# Patient Record
Sex: FEMALE | URBAN - METROPOLITAN AREA
[De-identification: names, ages, dates, MRNs, and addresses within clinical notes are randomized per-mention and may not be internally consistent; named-entity substitution may affect disease eponyms.]

---

## 2017-06-13 ENCOUNTER — OFFICE VISIT (OUTPATIENT)
Dept: FAMILY MEDICINE | Facility: CLINIC | Age: 42
End: 2017-06-13
Payer: COMMERCIAL

## 2017-06-13 VITALS
DIASTOLIC BLOOD PRESSURE: 73 MMHG | HEIGHT: 60 IN | HEART RATE: 89 BPM | TEMPERATURE: 99.2 F | BODY MASS INDEX: 32.71 KG/M2 | SYSTOLIC BLOOD PRESSURE: 126 MMHG | OXYGEN SATURATION: 98 % | WEIGHT: 166.6 LBS

## 2017-06-13 DIAGNOSIS — J45.30 MILD PERSISTENT ASTHMA WITHOUT COMPLICATION: Primary | ICD-10-CM

## 2017-06-13 PROCEDURE — 99214 OFFICE O/P EST MOD 30 MIN: CPT | Performed by: FAMILY MEDICINE

## 2017-06-13 RX ORDER — ALBUTEROL SULFATE 90 UG/1
2 AEROSOL, METERED RESPIRATORY (INHALATION) EVERY 4 HOURS PRN
Qty: 1 INHALER | Refills: 3 | Status: SHIPPED | OUTPATIENT
Start: 2017-06-13

## 2017-06-13 RX ORDER — MONTELUKAST SODIUM 10 MG/1
10 TABLET ORAL AT BEDTIME
Qty: 90 TABLET | Refills: 3 | Status: SHIPPED | OUTPATIENT
Start: 2017-06-13

## 2017-06-13 RX ORDER — FLUTICASONE PROPIONATE 50 MCG
1 SPRAY, SUSPENSION (ML) NASAL DAILY
COMMUNITY

## 2017-06-13 RX ORDER — PREDNISONE 20 MG/1
40 TABLET ORAL DAILY
Qty: 10 TABLET | Refills: 0 | Status: SHIPPED | OUTPATIENT
Start: 2017-06-13 | End: 2017-06-18

## 2017-06-13 RX ORDER — FLUTICASONE PROPIONATE AND SALMETEROL XINAFOATE 115; 21 UG/1; UG/1
2 AEROSOL, METERED RESPIRATORY (INHALATION) 2 TIMES DAILY
Qty: 12 G | Refills: 11 | Status: SHIPPED | OUTPATIENT
Start: 2017-06-13

## 2017-06-13 NOTE — PROGRESS NOTES
"  SUBJECTIVE:                                                    Aleshia Land is a 42 year old female who presents to clinic today for the following health issues:      ALLERGIES/ASTHMA      Onset: Worsened last week     Description:   Nasal congestion: YES  Sneezing: YES  Red, itchy eyes: YES    Progression of Symptoms:  same    Accompanying Signs & Symptoms:  Cough: YES  Wheezing: YES  Rash: YES- face and arms   Sinus/facial pain: no   History:   Is it seasonal: in the winter and in the summer   History of Asthma: YES- diagnosed in [place], moved the [place] two years ago   Has allergy testing been done: YES    Precipitating factors:   None    Alleviating factors:  None       Therapies Tried and outcome: Allegra, Flonase, Benadryl, Claritin, Zyrtec- no improvement       Problem list and histories reviewed & adjusted, as indicated.  Additional history: as documented    Patient Active Problem List   Diagnosis     Mild persistent asthma without complication     History reviewed. No pertinent surgical history.    Social History   Substance Use Topics     Smoking status: Never Smoker     Smokeless tobacco: Not on file     Alcohol use No     Family History   Problem Relation Age of Onset     DIABETES Mother            Reviewed and updated as needed this visit by clinical staff       Reviewed and updated as needed this visit by Provider         ROS:  Constitutional, HEENT, cardiovascular, pulmonary, GI, , musculoskeletal, neuro, skin, endocrine and psych systems are negative, except as otherwise noted.    OBJECTIVE:                                                    /73 (BP Location: Left arm, Patient Position: Sitting, Cuff Size: Adult Regular)  Pulse 89  Temp 99.2  F (37.3  C) (Oral)  Ht 5' 0.04\" (1.525 m)  Wt 166 lb 9.6 oz (75.6 kg)  SpO2 98%  BMI 32.49 kg/m2  Body mass index is 32.49 kg/(m^2).  GENERAL: healthy, alert and no distress  NECK: no adenopathy, no asymmetry, masses, or scars and thyroid normal to " palpation  RESP: some wheezing  CV: regular rate and rhythm, normal S1 S2, no S3 or S4, no murmur, click or rub, no peripheral edema and peripheral pulses strong  ABDOMEN: soft, nontender, no hepatosplenomegaly, no masses and bowel sounds normal  MS: no gross musculoskeletal defects noted, no edema    Diagnostic Test Results:  none      ASSESSMENT/PLAN:                                                      1. Mild persistent asthma without complication  With exacerbation. Treat with prednisone burst for 5 days. Start Advair and Singulair for maintenance. May use albuterol as needed. RTC in 1-2 months for recheck.  - albuterol (PROAIR HFA/PROVENTIL HFA/VENTOLIN HFA) 108 (90 BASE) MCG/ACT Inhaler; Inhale 2 puffs into the lungs every 4 hours as needed  Dispense: 1 Inhaler; Refill: 3  - fluticasone-salmeterol (ADVAIR-HFA) 115-21 MCG/ACT Inhaler; Inhale 2 puffs into the lungs 2 times daily  Dispense: 12 g; Refill: 11  - predniSONE (DELTASONE) 20 MG tablet; Take 2 tablets (40 mg) by mouth daily for 5 days  Dispense: 10 tablet; Refill: 0  - montelukast (SINGULAIR) 10 MG tablet; Take 1 tablet (10 mg) by mouth At Bedtime  Dispense: 90 tablet; Refill: 3    See Patient Instructions    Kishore Mascorro MD, MD  [facility]

## 2017-06-13 NOTE — PATIENT INSTRUCTIONS
How to contact your care team providers:    [facility]  [phone]    Pharmacy [phone]      GIFTY Jar[address]illo [address] Dr., Dr., PA-C Christopher Jones, PA-C    Team Nurses: Ayde Chester and Vivian.    Clinic hours  M-Th 7am-7pm   Fri 7am-5pm.   Urgent care M-F 11am-9pm,   Sat/sun 9am-5pm.  Pharmacy M-F 8:00am-8pm Sat/sun 9am-5pm.

## 2017-06-13 NOTE — MR AVS SNAPSHOT
After Visit Summary   6/13/2017    Aleshia Land    MRN: [id number]           Patient Information     Date Of Birth          1975        Visit Information        Provider Department      6/13/2017 3:00 PM Kishore Mascorro MD [facility]        Today's Diagnoses     Mild persistent asthma without complication    -  1      Care Instructions    How to contact your care team providers:    [facility]  [phone]    Pharmacy [phone]      GIFTY Jaramillo Dr., Dr., Dr., PA-C Christopher Jones, PA-C    Team Nurses: Ayde Chester, and Vivian.    Clinic hours  M-Th 7am-7pm   Fri 7am-5pm.   Urgent care M-F 11am-9pm,   Sat/sun 9am-5pm.  Pharmacy M-F 8:00am-8pm Sat/sun 9am-5pm.                   Follow-ups after your visit        Your next 10 appointments already scheduled     Jun 13, 2017  3:00 PM CDT   Office Visit with Kishore Mascorro MD   [facility] ([facility])    [address] [place] [id number] [phone]           Bring a current list of meds and any records pertaining to this visit.  For Physicals, please bring immunization records and any forms needing to be filled out.  Please arrive 10 minutes early to complete paperwork.              Who to contact     If you have questions or need follow up information about today's clinic visit or your schedule please contact [facility] directly at [phone].  Normal or non-critical lab and imaging results will be communicated to you by MyChart, letter or phone within 4 business days after the clinic has received the results. If you do not hear from us within 7 days, please contact the clinic through MyChart or phone. If you have a critical or abnormal lab result, we will notify you by phone as soon as  "possible.  Submit refill requests through Particle or call your pharmacy and they will forward the refill request to us. Please allow 3 business days for your refill to be completed.          Additional Information About Your Visit        Particle Information     Particle lets you send messages to your doctor, view your test results, renew your prescriptions, schedule appointments and more. To sign up, go to [email or web address] . Click on \"Log in\" on the left side of the screen, which will take you to the Welcome page. Then click on \"Sign up Now\" on the right side of the page.     You will be asked to enter the access code listed below, as well as some personal information. Please follow the directions to create your username and password.     Your access code is: [email or web address]  Expires: 2017  2:59 PM     Your access code will  in 90 days. If you need help or a new code, please call your [facility] clinic or [phone].        Care EveryWhere ID     This is your Care EveryWhere ID. This could be used by other organizations to access your [facility] medical records  [id number]        Your Vitals Were     Pulse Temperature Height Pulse Oximetry BMI (Body Mass Index)       89 99.2  F (37.3  C) (Oral) 5' 0.04\" (1.525 m) 98% 32.49 kg/m2        Blood Pressure from Last 3 Encounters:   17 126/73    Weight from Last 3 Encounters:   17 166 lb 9.6 oz (75.6 kg)              Today, you had the following     No orders found for display         Today's Medication Changes          These changes are accurate as of: 17  2:59 PM.  If you have any questions, ask your nurse or doctor.               Start taking these medicines.        Dose/Directions    albuterol 108 (90 BASE) MCG/ACT Inhaler   Commonly known as:  PROAIR HFA/PROVENTIL HFA/VENTOLIN HFA   Used for:  Mild persistent asthma without complication   Started by:  Kishore Mascorro MD        Dose:  2 puff   Inhale 2 puffs into the lungs every 4 hours as " needed   Quantity:  1 Inhaler   Refills:  3       fluticasone-salmeterol 115-21 MCG/ACT Inhaler   Commonly known as:  ADVAIR-HFA   Used for:  Mild persistent asthma without complication   Started by:  Kishore Mascorro MD        Dose:  2 puff   Inhale 2 puffs into the lungs 2 times daily   Quantity:  12 g   Refills:  11       montelukast 10 MG tablet   Commonly known as:  SINGULAIR   Used for:  Mild persistent asthma without complication   Started by:  Kishore Mascorro MD        Dose:  10 mg   Take 1 tablet (10 mg) by mouth At Bedtime   Quantity:  90 tablet   Refills:  3       predniSONE 20 MG tablet   Commonly known as:  DELTASONE   Used for:  Mild persistent asthma without complication   Started by:  Kishore Mascorro MD        Dose:  40 mg   Take 2 tablets (40 mg) by mouth daily for 5 days   Quantity:  10 tablet   Refills:  0         Stop taking these medicines if you haven't already. Please contact your care team if you have questions.     ALLEGRA PO   Stopped by:  Kishore Mascorro MD           BENADRYL PO   Stopped by:  Kishore Mascorro MD                Where to get your medicines      These medications were sent to [facility] [place], [place] - [address]  [address], [facility] [address]     Phone:  [phone]     albuterol 108 (90 BASE) MCG/ACT Inhaler    fluticasone-salmeterol 115-21 MCG/ACT Inhaler    montelukast 10 MG tablet    predniSONE 20 MG tablet                Primary Care Provider    None Specified       No primary provider on file.        Thank you!     Thank you for choosing [facility]  for your care. Our goal is always to provide you with excellent care. Hearing back from our patients is one way we can continue to improve our services. Please take a few minutes to complete the written survey that you may receive in the mail after your visit with us. Thank you!             Your Updated Medication List - Protect others around you: Learn how to safely use, store and  throw away your medicines at www.disposemymeds.org.          This list is accurate as of: 6/13/17  2:59 PM.  Always use your most recent med list.                   Brand Name Dispense Instructions for use    albuterol 108 (90 BASE) MCG/ACT Inhaler    PROAIR HFA/PROVENTIL HFA/VENTOLIN HFA    1 Inhaler    Inhale 2 puffs into the lungs every 4 hours as needed       fluticasone 50 MCG/ACT spray    FLONASE     Spray 1 spray into both nostrils daily       fluticasone-salmeterol 115-21 MCG/ACT Inhaler    ADVAIR-HFA    12 g    Inhale 2 puffs into the lungs 2 times daily       montelukast 10 MG tablet    SINGULAIR    90 tablet    Take 1 tablet (10 mg) by mouth At Bedtime       predniSONE 20 MG tablet    DELTASONE    10 tablet    Take 2 tablets (40 mg) by mouth daily for 5 days

## 2017-06-13 NOTE — NURSING NOTE
"Chief Complaint   Patient presents with     Asthma Consult       Initial /73 (BP Location: Left arm, Patient Position: Sitting, Cuff Size: Adult Regular)  Pulse 89  Temp 99.2  F (37.3  C) (Oral)  Ht 5' 0.04\" (1.525 m)  Wt 166 lb 9.6 oz (75.6 kg)  SpO2 98%  BMI 32.49 kg/m2 Estimated body mass index is 32.49 kg/(m^2) as calculated from the following:    Height as of this encounter: 5' 0.04\" (1.525 m).    Weight as of this encounter: 166 lb 9.6 oz (75.6 kg).  Medication Reconciliation: complete   Jannie Miranda MA      "

## 2017-06-14 ASSESSMENT — ASTHMA QUESTIONNAIRES: ACT_TOTALSCORE: 10

## 2017-08-21 ENCOUNTER — TELEPHONE (OUTPATIENT)
Dept: FAMILY MEDICINE | Facility: CLINIC | Age: 42
End: 2017-08-21

## 2017-08-21 NOTE — LETTER
August 21, 2017      Aleshia Land  [address] [place] [address]        Dear Aleshia Land,      At [facility] we care about your health and are committed to providing quality patient care. It has come to our attention that you are due for an Asthma Control Test.     This screening tool helps us to assess how well your asthma is controlled. Good asthma control leads to less asthma symptoms and greater health. If your asthma is not in good control (score less than 20) it is recommended you be seen by your provider for medication and lifestyle adjustments    We have enclosed an  Asthma Control Test. Please complete the enclosed asthma control test even if you are feeling well. You can mail it back to our clinic or drop if off at our .     Thank you for your time and we hope this letter finds you well!      Sincerely,    Your Team at [facility]

## 2017-09-29 NOTE — TELEPHONE ENCOUNTER
This writer attempted to contact Aleshia on 09/29/17      Reason for call ACT and left detailed message.      If patient calls back:   Patient contacted by 1st floor [place] Care Team (MA/TC). Inform patient that someone from the team will contact them, document that pt called and route to care team. .        Alejandro Yanes, CMA

## 2017-10-27 ASSESSMENT — ASTHMA QUESTIONNAIRES: ACT_TOTALSCORE: 24

## 2018-03-13 ENCOUNTER — TELEPHONE (OUTPATIENT)
Dept: FAMILY MEDICINE | Facility: CLINIC | Age: 43
End: 2018-03-13

## 2018-03-13 NOTE — TELEPHONE ENCOUNTER
Panel Management Review      Patient is due/failing the following:   PAP and PHYSICAL  Action needed:   Patient needs office visit for Physical.    Type of outreach:    Sent letter.    Questions for provider review:    None    JUANITA Sam

## 2018-03-13 NOTE — LETTER
March 13, 2018      Aleshia Land  [address] [place] [address]                Dear Aleshia,    In order to ensure we are providing the best quality care, we have reviewed your chart and see that you are due for:    -Physical with pap smear: Pap smear is a screening test used to detect cervical cancer. It is recommended every three years for women 21 and older. The test should be done at least once every three years but women who are at greater risk for cervical cancer may need to have the test more often.    Please call the clinic at your earliest convenience to schedule an appointment. If you have had any of the screenings listed above at another facility, please call us so that we may update your chart.      Thank you for trusting us with your health care.    Sincerely,    [facility]/ [phone]  [phone]

## 2018-06-26 ENCOUNTER — TELEPHONE (OUTPATIENT)
Dept: FAMILY MEDICINE | Facility: CLINIC | Age: 43
End: 2018-06-26

## 2018-06-26 NOTE — LETTER
June 26, 2018    Aleshia Land  [address] [place] [address]      Dear Aleshia Land,     In order to ensure we are providing the best quality care, we have reviewed your chart and see that you are due for:    Physical with pap smear: Pap smear is a screening test used to detect cervical cancer. It is recommended every three years for women 21 and older. The test should be done at least once every three years but women who are at greater risk for cervical cancer may need to have the test more often.    Please call the clinic at your earliest convenience to schedule an appointment. If you have had any of the screenings listed above at another facility, please call us so that we may update your chart.      Thank you for trusting us with your health care.    Sincerely,    [facility]/mb  [phone]  [phone]

## 2018-06-26 NOTE — TELEPHONE ENCOUNTER
Panel Management Review      BP Readings from Last 1 Encounters:   06/13/17 126/73      Last Office Visit with this department: 3/13/2018    Fail List measure: PAP      Patient is due/failing the following:   PAP    Action needed:   Patient needs office visit for PHYSICAL.    Type of outreach:    Sent letter.    Questions for provider review:    None                                                                                                                                    Shannan Norris MA      Chart routed to  .

## 2024-12-17 ENCOUNTER — OFFICE VISIT (OUTPATIENT)
Dept: FAMILY MEDICINE | Facility: CLINIC | Age: 49
End: 2024-12-17

## 2024-12-17 VITALS
OXYGEN SATURATION: 98 % | DIASTOLIC BLOOD PRESSURE: 83 MMHG | RESPIRATION RATE: 16 BRPM | TEMPERATURE: 97.8 F | SYSTOLIC BLOOD PRESSURE: 146 MMHG | HEART RATE: 101 BPM | BODY MASS INDEX: 39.15 KG/M2 | HEIGHT: 59 IN | WEIGHT: 194.2 LBS

## 2024-12-17 DIAGNOSIS — J06.9 VIRAL URI WITH COUGH: Primary | ICD-10-CM

## 2024-12-17 DIAGNOSIS — Z59.71 DOES NOT HAVE HEALTH INSURANCE: ICD-10-CM

## 2024-12-17 DIAGNOSIS — Z87.09 HISTORY OF ASTHMA: ICD-10-CM

## 2024-12-17 DIAGNOSIS — R03.0 ELEVATED BLOOD PRESSURE READING WITHOUT DIAGNOSIS OF HYPERTENSION: ICD-10-CM

## 2024-12-17 DIAGNOSIS — R07.0 THROAT PAIN: ICD-10-CM

## 2024-12-17 LAB
DEPRECATED S PYO AG THROAT QL EIA: NEGATIVE
S PYO DNA THROAT QL NAA+PROBE: NOT DETECTED

## 2024-12-17 PROCEDURE — 87798 DETECT AGENT NOS DNA AMP: CPT | Performed by: PREVENTIVE MEDICINE

## 2024-12-17 PROCEDURE — 87651 STREP A DNA AMP PROBE: CPT | Performed by: PREVENTIVE MEDICINE

## 2024-12-17 PROCEDURE — 99203 OFFICE O/P NEW LOW 30 MIN: CPT | Performed by: PREVENTIVE MEDICINE

## 2024-12-17 RX ORDER — PREDNISONE 20 MG/1
20 TABLET ORAL 2 TIMES DAILY
Qty: 10 TABLET | Refills: 0 | Status: SHIPPED | OUTPATIENT
Start: 2024-12-17 | End: 2024-12-22

## 2024-12-17 RX ORDER — BENZONATATE 200 MG/1
200 CAPSULE ORAL 3 TIMES DAILY PRN
Qty: 30 CAPSULE | Refills: 0 | Status: SHIPPED | OUTPATIENT
Start: 2024-12-17

## 2024-12-17 NOTE — PROGRESS NOTES
Assessment & Plan     Viral URI with cough  -await labs  -hydration and monitor temperature  - B. pertussis/parapertussis PCR-NP  - benzonatate (TESSALON) 200 MG capsule  Dispense: 30 capsule; Refill: 0  - predniSONE (DELTASONE) 20 MG tablet  Dispense: 10 tablet; Refill: 0    Throat pain  -saline gargles  -defer Covid and Mono test for now. Even if positive for Covid will not  plan, clinically less likely to be [place] and patient is self pay  - Streptococcus A Rapid Screen w/Reflex to PCR - Clinic Collect  - predniSONE (DELTASONE) 20 MG tablet  Dispense: 10 tablet; Refill: 0    Does not have health insurance  - Primary Care - Care Coordination Referral    History of asthma  -no recent exacerbation     Elevated blood pressure reading without diagnosis of hypertension  -DASH diet  -monitor at store  -low salt  -avoid over the counter decongestants.         Your symptoms and exam today indicate that you have a viral upper respiratory illness.  This includes viral rhinosinusitis and viral bronchitis.  Antibiotics do not help viral illnesses. The typical course of a viral illness is that you feel miserable for the first few days - with sore throat, runny nose/nasal congestion, cough, and sometimes fever and body aches.  You should start to feel better after about 5-7 days and much better by 10-14 days.  If you develop sudden worsening of symptoms or fever after the first 5-7 days, or if you have persistence of your symptoms beyond 14 days, let us know as you may have developed a secondary bacterial infection.  Get plenty of rest, especially while you have a fever.  Drink lots of fluids such as water and clear soups. Fluids help loosen mucus. Fluids are also important because they help prevent dehydration. Gargle with warm salt water a few times a day to relieve a sore throat. Throat sprays or lozenges may also help relieve the pain.Avoid alcohol. Use saline (salt water) nose drops to help loosen mucus  "and moisten the tender skin in your nose.      BMI  Estimated body mass index is 39.22 kg/m  as calculated from the following:    Height as of this encounter: 1.499 m (4' 11\").    Weight as of this encounter: 88.1 kg (194 lb 3.2 oz).       Lidya Monk is a 49 year old, presenting for the following health issues:  Cold Symptoms        12/17/2024     3:42 PM   Additional Questions   Roomed by Jacob   Accompanied by self         12/17/2024     3:42 PM   Patient Reported Additional Medications   Patient reports taking the following new medications no     History of Present Illness       Reason for visit:  [facility]  Symptom onset:  3-4 weeks ago  Symptom intensity:  Severe  Symptom progression:  Worsening  Had these symptoms before:  Yes  Has tried/received treatment for these symptoms:  No      Sore throat for 4 weeks, started with sore throat and then cold symptoms that improved but throat pain has persisted  Other family members are better  More in the morning  No wheezing  No shortness of breath  Stayed home yesterday  No fever  No rash  No GI problems  Fluids+   Cough+ stuck in throat kind of feeling     Cough is strong, no post tussive emesis  Family had been sick too  Cleans houses+          Objective    BP (!) 146/83 (BP Location: Left arm, Patient Position: Sitting, Cuff Size: Adult Large)   Pulse 101   Temp 97.8  F (36.6  C) (Temporal)   Resp 16   Ht 1.499 m (4' 11\")   Wt 88.1 kg (194 lb 3.2 oz)   LMP 10/01/2024 (Approximate)   SpO2 98%   BMI 39.22 kg/m    Body mass index is 39.22 kg/m .  Physical Exam   GENERAL APPEARANCE: healthy, alert and no distress  EYES: Eyes grossly normal to inspection and conjunctivae and sclerae normal  HENT: nose and mouth without ulcers or lesions, mild pharyngeal erythema, no exudates or pus points, no uvular deviation.   NECK: no adenopathy and trachea midline and normal to palpation  RESP: lungs clear to auscultation - no rales, rhonchi or wheezes  CV: regular " rates and rhythm, normal S1 S2  ABDOMEN: soft, non-tender and no rebound or guarding   MS: extremities normal- no gross deformities noted   SKIN: no suspicious lesions or rashes  NEURO: Normal strength and tone, mentation intact and speech normal  PSYCH: mentation appears normal      No results found for this or any previous visit (from the past 24 hours).        Signed Electronically by: Marilee Julien MD

## 2024-12-17 NOTE — LETTER
December 18, 2024      Aleshia Land  [address] [place] [address]        Dear Aleshia Land,     Pertussis test is negative.     Regards,     Marilee Julien MD MPH     Resulted Orders   Streptococcus A Rapid Screen w/Reflex to PCR - Clinic Collect   Result Value Ref Range    Group A Strep antigen Negative Negative   B. pertussis/parapertussis PCR-NP   Result Value Ref Range    Bordetella pertussis DNA Not Detected Not Detected      Comment:      A negative result does not rule out the presence of PCR inhibitors or Bordetella pertussis DNA in concentrations below the limit of detection of the assay.    Bordetella parapertussis DNA Not Detected Not Detected      Comment:      A negative result does not rule out the presence of PCR inhibitors or Bordetella parapertussis DNA in concentrations below the limit of detection for the assay.    Narrative    The [employer] Molecular Simplexa (TM) Bordetella Direct assay is a FDA-approved, real-time PCR test for the qualitative detection and differentiation of Bordetella pertussis and Bordetella parapertussis in nasopharyngeal swabs. Testing is performed by the [facility]. This test is used for clinical purposes. It should not be regarded as investigational or for research. This laboratory is certified under the Clinical Laboratory Improvement Amendments of 1988 (CLIA-88) as qualified to perform high complexity clinical laboratory testing.   Group A Streptococcus PCR Throat Swab   Result Value Ref Range    Group A strep by PCR Not Detected Not Detected    Narrative    The Xpert Xpress Strep A test, performed on the [employer]  Instrument Systems, is a rapid, qualitative in vitro diagnostic test for the detection of Streptococcus pyogenes (Group A ß-hemolytic Streptococcus, Strep A) in throat swab specimens from patients with signs and symptoms of pharyngitis. The Xpert Xpress Strep A test can be used as an aid in  the diagnosis of Group A Streptococcal pharyngitis. The assay is not intended to monitor treatment for Group A Streptococcus infections. The Xpert Xpress Strep A test utilizes an automated real-time polymerase chain reaction (PCR) to detect Streptococcus pyogenes DNA.

## 2024-12-17 NOTE — LETTER
December 18, 2024      Aleshia Land  [address] [place] [address]        Dear ,    Strep test was negative.   Please let me know if you have any questions and thank you for choosing [facility].     Regards,     Marilee Julien MD MPH     Resulted Orders   Streptococcus A Rapid Screen w/Reflex to PCR - Clinic Collect   Result Value Ref Range    Group A Strep antigen Negative Negative   Group A Streptococcus PCR Throat Swab   Result Value Ref Range    Group A strep by PCR Not Detected Not Detected    Narrative    The Xpert Xpress Strep A test, performed on the [employer], is a rapid, qualitative in vitro diagnostic test for the detection of Streptococcus pyogenes (Group A ß-hemolytic Streptococcus, Strep A) in throat swab specimens from patients with signs and symptoms of pharyngitis. The Xpert Xpress Strep A test can be used as an aid in the diagnosis of Group A Streptococcal pharyngitis. The assay is not intended to monitor treatment for Group A Streptococcus infections. The Xpert Xpress Strep A test utilizes an automated real-time polymerase chain reaction (PCR) to detect Streptococcus pyogenes DNA.

## 2024-12-17 NOTE — LETTER
December 18, 2024      Aleshia Land  [address] [place] [address]        Dear ,    We are writing to inform you of your test results.    Strep test is negative.    Resulted Orders   Group A Streptococcus PCR Throat Swab   Result Value Ref Range    Group A strep by PCR Not Detected Not Detected    Narrative    The Xpert Xpress Strep A test, performed on the [employer]  Instrument Systems, is a rapid, qualitative in vitro diagnostic test for the detection of Streptococcus pyogenes (Group A ß-hemolytic Streptococcus, Strep A) in throat swab specimens from patients with signs and symptoms of pharyngitis. The Xpert Xpress Strep A test can be used as an aid in the diagnosis of Group A Streptococcal pharyngitis. The assay is not intended to monitor treatment for Group A Streptococcus infections. The Xpert Xpress Strep A test utilizes an automated real-time polymerase chain reaction (PCR) to detect Streptococcus pyogenes DNA.       If you have any questions or concerns, please call the clinic at the number listed above.       Sincerely,      Marilee Julien MD

## 2024-12-18 ENCOUNTER — TELEPHONE (OUTPATIENT)
Dept: CARE COORDINATION | Facility: CLINIC | Age: 49
End: 2024-12-18

## 2024-12-18 ENCOUNTER — PATIENT OUTREACH (OUTPATIENT)
Dept: CARE COORDINATION | Facility: CLINIC | Age: 49
End: 2024-12-18

## 2024-12-18 DIAGNOSIS — L30.9 DERMATITIS: Primary | ICD-10-CM

## 2024-12-18 LAB
B PARAPERT DNA SPEC QL NAA+PROBE: NOT DETECTED
B PERT DNA SPEC QL NAA+PROBE: NOT DETECTED

## 2024-12-18 NOTE — PROGRESS NOTES
Community Health Worker Initial Outreach    CHW Initial Information Gathering:  Referral Source: PCP  Preferred Hospital: [facility], [place]  [phone]  Preferred Urgent Care: [facility] [place], [phone]  Current living arrangement:: I live in a private home  Type of residence:: Private home - stairs  Community Resources: None  Supplies Currently Used at Home: None  Equipment Currently Used at Home: none  Informal Support system:: Family  No PCP office visit in Past Year: No  Transportation means:: Regular car  CHW Additional Questions  If ED/Hospital discharge, follow-up appointment scheduled as recommended?: N/A  Medication changes made following ED/Hospital discharge?: N/A  MyChart active?: No  Patient agreeable to assistance with activating MyChart?: No    Patient accepts CC: Yes. Patient scheduled for assessment with CC SW on 12/20 at 2 pm. Patient noted desire to discuss insurance options and other financial concerns.     ZEE Fragoso  [facility] [place], [place] and [facility]  [phone]

## 2024-12-18 NOTE — TELEPHONE ENCOUNTER
Patient states that she forgot to mention when she was in for her visit that she has been having issues with her eczema. She has tried multiple OTC creams and none have helped. She states it is worse now with the winter weather.     Would like a prescription for something for her eczema.

## 2024-12-18 NOTE — RESULT ENCOUNTER NOTE
Please send a letter:    Dear Aleshia Land,    Pertussis test is negative.     Regards,    Marilee Julien MD MPH

## 2024-12-18 NOTE — TELEPHONE ENCOUNTER
Routing to Dr. Julien (who patient saw yesterday) to review/advise, thank you      Tyesha Sanchez, RN, BSN  [facility], [place], [place] and [place]

## 2024-12-18 NOTE — RESULT ENCOUNTER NOTE
Please send a letter:    Dear Aleshia Land,    Strep test was negative.  Please let me know if you have any questions and thank you for choosing [facility].    Regards,    Marilee Julien MD MPH

## 2024-12-18 NOTE — PROGRESS NOTES
[facility]/Voicemail    Clinical Data: Care Coordinator Outreach    Outreach Documentation Number of Outreach Attempt   12/18/2024  10:41 AM 1       Left message on patient's voicemail with call back information and requested return call.      Plan:   Care Coordinator will try to reach patient again in 1-2 business days.    ZEE Fragoso, [place], [place] and [facility]  [phone]

## 2024-12-19 RX ORDER — TRIAMCINOLONE ACETONIDE 1 MG/G
OINTMENT TOPICAL 2 TIMES DAILY
Qty: 45 G | Refills: 1 | Status: SHIPPED | OUTPATIENT
Start: 2024-12-19

## 2024-12-19 NOTE — TELEPHONE ENCOUNTER
Medication sent. Please follow up in 4-6 weeks if symptoms are not better.  Thank you,  Marilee Julien MD MPH

## 2024-12-19 NOTE — TELEPHONE ENCOUNTER
RN spoke with pt and relayed provider message. Pt verbalized understanding, no questions.     Kaila Alexandre RN

## 2024-12-23 ENCOUNTER — PATIENT OUTREACH (OUTPATIENT)
Dept: CARE COORDINATION | Facility: CLINIC | Age: 49
End: 2024-12-23

## 2024-12-23 NOTE — PROGRESS NOTES
FRW UPDATE :  12/23/24 - Outreach attempted x 1 was unable to reach. Left message on voicemail with call back information and requested return call.  Plan: Current outreach date reflects FRW 's follow up within one week.

## 2024-12-30 ENCOUNTER — PATIENT OUTREACH (OUTPATIENT)
Dept: CARE COORDINATION | Facility: CLINIC | Age: 49
End: 2024-12-30

## 2024-12-30 NOTE — PROGRESS NOTES
FRW UPDATE :  12/30/24 - Patient is unsure what her income is at this time. FRW will call her later this week after she reviews her income. FRW will follow up within 1 week.

## 2025-01-09 ENCOUNTER — PATIENT OUTREACH (OUTPATIENT)
Dept: CARE COORDINATION | Facility: CLINIC | Age: 50
End: 2025-01-09

## 2025-01-09 NOTE — PROGRESS NOTES
FR UPDATE :  1/9/25 - Outreach attempted x 2. Left message on voicemail indicating last outreach attempt.   Plan: FRW closed the FRW program, sent letter. Patient can be referred back to [facility] if needed.

## 2025-01-16 ENCOUNTER — PATIENT OUTREACH (OUTPATIENT)
Dept: CARE COORDINATION | Facility: CLINIC | Age: 50
End: 2025-01-16

## 2025-01-16 DIAGNOSIS — Z71.89 OTHER SPECIFIED COUNSELING: Primary | Chronic | ICD-10-CM

## 2025-01-16 NOTE — PROGRESS NOTES
Clinic Care Coordination Contact  Follow Up Progress Note      Assessment: KAYDEN DUDLEY made monthly outreach call to pt. Pt noted she tried to call the FRW back but was not able to get connect. Pt was not able to complete [place] application. KAYDEN DUDLEY offered to send a link via [employer] for pt to apply online. Pt agreeable to plan. Pt asked for KAYDEN DUDLEY to resend FRW referral again for assistance. Pt did not schedule her annual preventative visit with PCP. She plans to wait on insurance coverage first.     KAYDEN DUDLEY resubmit FRW referral to assist with MA/SNAP application for pt.      Care Gaps:    Health Maintenance Due   Topic Date Due    ASTHMA ACTION PLAN  Never done    ADVANCE CARE PLANNING  Never done    MAMMO SCREENING  Never done    GLUCOSE  Never done    YEARLY PREVENTIVE VISIT  Never done    COLORECTAL CANCER SCREENING  Never done    HIV SCREENING  Never done    HEPATITIS C SCREENING  Never done    Pneumococcal Vaccine: Pediatrics (0 to 5 Years) and At-Risk Patients (6 to 49 Years) (1 of 2 - PCV) Never done    HEPATITIS B IMMUNIZATION (1 of 3 - 19+ 3-dose series) Never done    PAP  Never done    DTAP/TDAP/TD IMMUNIZATION (1 - Tdap) Never done    LIPID  Never done    ASTHMA CONTROL TEST  02/21/2018    INFLUENZA VACCINE (1) 09/01/2024    COVID-19 Vaccine (4 - 2024-25 season) 09/01/2024    PHQ-2 (once per calendar year)  01/01/2025    ZOSTER IMMUNIZATION (1 of 2) 01/28/2025       Care Plans  Care Plan: Financial Wellbeing       Problem: Patient expresses financial resource strain       Goal: Create an action plan to increase financial stability       Start Date: 12/20/2024 Expected End Date: 12/20/2025    This Visit's Progress: 10% Recent Progress: 0%    Priority: High    Note:     Barriers: No insurance for self and daughter. Employer does not offer insurance. Pt can not afford private pay for medical visits.  Strengths:  FRW referral placed.   Patient expressed understanding of goal: Yes  Action steps to achieve this goal:  I  understand a referral was placed to the Financial Resource Worker, I will receive a call within the next 3 business days. I understand the financial worker will make two attempts to call me.   I will connect with FRW to help me complete MA/SNAP application.   If I still need help with this goal, I will connect with care coordination                               Care Plan: Health Maintenance       Problem: Health Maintenance Due or Overdue and establish PCP       Goal: Become up-to-date with health maintenance visit(s)       Start Date: 12/20/2024 Expected End Date: 12/20/2025    This Visit's Progress: 10% Recent Progress: 10%    Priority: High    Note:     Barriers: Has never establish care with PCP.  Strengths: Independent and motivated to get established with PCP  Patient expressed understanding of goal: yes  Action steps to achieve this goal:    1. I will schedule my annual wellness visit; [phone] ([phone])  2. I will attend my annual wellness visit and discuss establishing with provider.  3. I will attend my schedule appointment.  4. I will contact my Care Management or clinic team if I have barriers to attending my annual wellness visit.                               Intervention/Education provided during outreach: Pt reports understanding and denies any additional questions or concerns at this time.      Outreach Frequency: monthly, more frequently as needed    Plan: Pt will review resources shared and work on goals as discussed.      KAYDEN CC submit MA/SNAP resources via mail to pt. KAYDEN CC will chart review every 4-6 weeks. CHW will follow up in one month.     Resources:   [place]  Link for Medical Assistance application   https://edocs.[facility].mn.us/lfserver/Public/DHS-3531-ENG  [place]NS[place]  [place]are/[place]  Information line   [phone]  Can help find and assister/navigator          To Apply - Contact [place] - [phone]  www.mnsure.org  Free application & enrollment help  Find a navigator or  bud in your community.  Find an enrollment event near you.   Call the Contact Center at [phone] (or [phone])  Hours: Monday- Friday: 8 a.m. to 4 p.m.    https://www.[employer].org/shop-compare/financial-help/income-guidelines/index.jsp    [place] and [place] income guidelines    https://apply.mn.[facility].mn.gov/  Apply[place] is a simple, secure Web application that connects you with state and [facility] services to help meet your and your family's basic needs. You can use Apply[place] to apply for: cash assistance,  assistance,  Emergency help gives aid to a family with an emergency such as an eviction or loss from a fire. Contact the county office where you live for more information, emergency help, and Supplemental Nutrition Assistance Program (SNAP) benefits,  does not tell you if you qualify but send your information to county, state, [place] agency for review      MARIS Arora  Social Work Primary Care Clinic Care Coordinator  [facility]  [phone]  [email or web address]

## 2025-01-16 NOTE — LETTER
January 16, 2025      Aleshia Land  [address]  [place] [address]        Dear Aleshia,     Below is the MA/SNAP resources we discussed. I tried to send a [employer]Share message, but the system stated you are not able to receive uShare message. Please type in the web link into a browser on your computer. You can apply for MA/SNAP benefits. I did place another financial resources worker to connect with you to help. Please not FRW will make two attempt calls only to reach you and assist with MA application.     MA  Link for Medical Assistance application   https://edocs.dhs.[facility].mn.us/lfserver/Public/DHS-3531-ENG  MNSURE  [place]are/[place]ure  Information line   [phone]  Can help find and assister/navigator          To Apply - Contact center - [phone]  www.[employer].org  Free application & enrollment help  Find a navigator or  in your community.  Find an enrollment event near you.   Call the Contact Center at [phone] (or [phone])  Hours: Monday- Friday: 8 a.m. to 4 p.m.    https://www.[employer].org/shop-compare/financial-help/income-guidelines/index.jsp    [place] and [place]care income guidelines    https://apply.mn.[facility].mn.gov/  Apply[place] is a simple, secure Web application that connects you with state and [facility] services to help meet your and your family's basic needs. You can use Apply[place] to apply for: cash assistance,  assistance,  Emergency help gives aid to a family with an emergency such as an eviction or loss from a fire. Contact the county office where you live for more information, emergency help, and Supplemental Nutrition Assistance Program (SNAP) benefits,  does not tell you if you qualify but send your information to county, state, [place] agency for review        Please call me with any questions. Thank You.       Sincerely,    Benita Gomez, [facility]  Social Work Primary Care Clinic Care Coordinator  [facility]  [phone]   [email or web address]

## 2025-01-20 ENCOUNTER — PATIENT OUTREACH (OUTPATIENT)
Dept: CARE COORDINATION | Facility: CLINIC | Age: 50
End: 2025-01-20

## 2025-02-27 ENCOUNTER — PATIENT OUTREACH (OUTPATIENT)
Dept: CARE COORDINATION | Facility: CLINIC | Age: 50
End: 2025-02-27

## 2025-02-27 NOTE — PROGRESS NOTES
Clinic Care Coordination Contact  Care Coordination Clinician Chart Review    Situation: Patient chart reviewed by Care Coordinator.       Background: Care Coordination Program started: 12/17/2024. Initial assessment completed and patient-centered care plan(s) were developed with participation from patient. Lead CC handed patient off to CHW for continued outreaches.       Assessment: Per chart review, CHW has not been able to make successful outreach to patient. CHW has attempted on 2/21/25 with the next outreach scheduled for 3/7/25. Unable to provide/report update on pt goals at this time. Patient's goal(s) appropriate and relevant at this time. Patient is not due for updated Plan of Care.  Assessments will be completed annually or as needed/with change of patient status.      Care Plan: Financial Wellbeing       Problem: Patient expresses financial resource strain       Goal: Create an action plan to increase financial stability       Start Date: 12/20/2024 Expected End Date: 12/20/2025    This Visit's Progress: 10% Recent Progress: 0%    Priority: High    Note:     Barriers: No insurance for self and daughter. Employer does not offer insurance. Pt can not afford private pay for medical visits.  Strengths:  FRW referral placed.   Patient expressed understanding of goal: Yes  Action steps to achieve this goal:  I understand a referral was placed to the Financial Resource Worker, I will receive a call within the next 3 business days. I understand the financial worker will make two attempts to call me.   I will connect with FRW to help me complete MA/SNAP application.   If I still need help with this goal, I will connect with care coordination                               Care Plan: Health Maintenance       Problem: Health Maintenance Due or Overdue and establish PCP       Goal: Become up-to-date with health maintenance visit(s)       Start Date: 12/20/2024 Expected End Date: 12/20/2025    This Visit's  Progress: 10% Recent Progress: 10%    Priority: High    Note:     Barriers: Has never establish care with PCP.  Strengths: Independent and motivated to get established with PCP  Patient expressed understanding of goal: yes  Action steps to achieve this goal:    1. I will schedule my annual wellness visit; [phone] ([phone])  2. I will attend my annual wellness visit and discuss establishing with provider.  3. I will attend my schedule appointment.  4. I will contact my Care Management or clinic team if I have barriers to attending my annual wellness visit.                                  Plan/Recommendations: The patient will continue working with Care Coordination to achieve goal(s) as above. CHW will continue outreaches at minimum every 30 days and will involve Lead CC as needed or if patient is ready to move to Maintenance. Lead CC will continue to monitor CHW outreaches and patient's progress to goal(s) every 6 weeks.     Plan of Care updated and sent to patient: MARIS Schmitz  Social Work Primary Care Clinic Care Coordinator  [facility]  [phone]  [email or web address]

## 2025-03-10 ENCOUNTER — PATIENT OUTREACH (OUTPATIENT)
Dept: CARE COORDINATION | Facility: CLINIC | Age: 50
End: 2025-03-10

## 2025-03-10 NOTE — PROGRESS NOTES
[facility]/Voicemail    Clinical Data: Care Coordinator Outreach    Outreach Documentation Number of Outreach Attempt   2/21/2025   3:56 PM 1   3/10/2025  12:37 PM 2       Left message on patient's voicemail with call back information and requested return call.      Plan:   Care Coordinator will try to reach patient again in 10 business days.    Next outreach due: 3/24/25

## 2025-03-24 ENCOUNTER — PATIENT OUTREACH (OUTPATIENT)
Dept: CARE COORDINATION | Facility: CLINIC | Age: 50
End: 2025-03-24

## 2025-03-24 NOTE — PROGRESS NOTES
[facility]/Voicemail    Clinical Data: Care Coordinator Outreach    Outreach Documentation Number of Outreach Attempt   2/21/2025   3:56 PM 1   3/10/2025  12:37 PM 2   3/24/2025  10:10 AM 3       Left message on patient's voicemail with call back information and requested return call.      Plan: Request chart review to disenroll. Unable to reach patient.  Care Coordinator will do no further outreaches at this time.    ZEE Fragoso [place], [place] and [facility]  [phone]

## 2025-03-27 ENCOUNTER — PATIENT OUTREACH (OUTPATIENT)
Dept: CARE COORDINATION | Facility: CLINIC | Age: 50
End: 2025-03-27

## 2025-03-27 NOTE — PROGRESS NOTES
Frw update  3/27/25:Frw spoke with pt. Pt's spouse applied for insurance for her and she will bring her insurance card to clinic. Pt is not interested in county benefits anymore. No further frw needs.

## 2025-04-03 ENCOUNTER — OFFICE VISIT (OUTPATIENT)
Dept: URGENT CARE | Facility: URGENT CARE | Age: 50
End: 2025-04-03
Payer: COMMERCIAL

## 2025-04-03 VITALS
DIASTOLIC BLOOD PRESSURE: 98 MMHG | OXYGEN SATURATION: 98 % | RESPIRATION RATE: 18 BRPM | HEART RATE: 79 BPM | TEMPERATURE: 98 F | SYSTOLIC BLOOD PRESSURE: 152 MMHG | WEIGHT: 192 LBS | BODY MASS INDEX: 38.78 KG/M2

## 2025-04-03 DIAGNOSIS — R20.0 NUMBNESS AND TINGLING IN BOTH HANDS: ICD-10-CM

## 2025-04-03 DIAGNOSIS — R20.2 NUMBNESS AND TINGLING IN BOTH HANDS: ICD-10-CM

## 2025-04-03 DIAGNOSIS — G56.03 BILATERAL CARPAL TUNNEL SYNDROME: Primary | ICD-10-CM

## 2025-04-03 DIAGNOSIS — R03.0 ELEVATED BLOOD PRESSURE READING WITHOUT DIAGNOSIS OF HYPERTENSION: ICD-10-CM

## 2025-04-03 NOTE — PROGRESS NOTES
Assessment & Plan     Diagnosis:    ICD-10-CM    1. Bilateral carpal tunnel syndrome  G56.03 Physical Therapy  Referral      2. Numbness and tingling in both hands  R20.0 PRIMARY CARE FOLLOW-UP SCHEDULING    R20.2       3. Elevated blood pressure reading without diagnosis of hypertension  R03.0         Medical Decision Making:  Aleshia Land is a 50 year old female who presents for evaluation of tingling in the bilateral hands/fingers.  This is consistent with median nerve distribution and given exam and history the likely diagnosis is carpal tunnel syndrome. Positive Tinel sign.  Discussed treatment.  Recommended carpal tunnel braces to be worn at night as a minimum. Discussed close follow up with PT. 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 No neck pain or radicular symptoms coming from above the elbows.  I doubt this is CVA, brain tumor, neck radiculopathy, dissection, ACS or other worrisome etiology. Certainly MS or other nervous system issues are a possibility necessitating close follow up but no indication for consultation or MRI/CT at this point.     Attending Attestation       I saw and evaluated (Aleshia Land) as part of a shared APRN student visit.    NP Student: Marija Villaseñor       I personally reviewed the vital signs.       I personally performed the substantive portion of the medical decision making for this visit        I personally performed the substantive portion of the physical exam        (Provider name and title: (Jake Manzo PA-C,)   Date of Service: April 3, 2025         Jake Manzo PA-C  [facility]    Subjective     Aleshia Land is a 50 year old female who presents to clinic today for the following health issues:  Chief Complaint   Patient presents with    Urgent Care    Numbness     Per patient states for the past two weeks she has been having numbness on both hands but now has been having pain and unable to sleep due to numbness and pain.         HPI  Aleshia is a 50 year old female with concerns of numbness, tingling, and pain in her fingers. She states the numbness has gotten progressively worse over the past two weeks and most noticeable at night. 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 She says the numbness and pain in her fingers disrupts her sleep. She reports not being to the doctor in 10 years and is wondering if she has diabetes and hypertension. She takes no medication. Denies numbness and tingling in her feet. Denies headaches.       Review of Systems    See HPI    Objective      Vitals: BP (!) 152/98 (BP Location: Right arm, Patient Position: Sitting, Cuff Size: Adult Large)   Pulse 79   Temp 98  F (36.7  C) (Oral)   Resp 18   Wt 87.1 kg (192 lb)   SpO2 98%   BMI 38.78 kg/m      Patient Vitals for the past 24 hrs:   BP Temp Temp src Pulse Resp SpO2 Weight   04/03/25 1610 (!) 152/98 -- -- -- -- -- --   04/03/25 1453 (!) 163/103 98  F (36.7  C) Oral 79 18 98 % 87.1 kg (192 lb)       Vital signs reviewed by: Jake Manzo PA-C    Physical Exam   Constitutional: Alert and active.  Non-toxic appearing. No acute distress.  Mouth: Mucous membranes are moist.   Neck: Normal ROM. No C-spine TTP.  Cardiovascular: Regular rate and rhythm  Pulmonary/Chest: Effort normal. No respiratory distress. Lungs are clear to auscultation throughout..   Musculoskeletal: 5/5 bilateral  strength. Negative Phalen. Positive Tinel sign. Normal range of motion.   Neurological: Alert and oriented x3.  Strength sensation is intact and symmetric in the bilateral upper extremities.  Skin: No rash noted on visualized skin.       Jake Manzo PA-C, April 3, 2025

## 2025-04-03 NOTE — PATIENT INSTRUCTIONS
"Less work with your wrists! Carpal tunnel syndrome requires you to rest your arms; it is helpful to be in a more neutral position (hand shake position). Get \"carpal tunnel braces\" from the store ([employer], [employer] etc) and wear these loosely (not constricting your wrist too much). Follow up with PT and primary care.  "

## 2025-04-03 NOTE — PROGRESS NOTES
Urgent Care Clinic Visit    Chief Complaint   Patient presents with    Urgent Care    Numbness     Per patient states for the past two weeks she has been having numbness on both hands but now has been having pain and unable to sleep due to numbness and pain.               4/3/2025     2:54 PM   Additional Questions   Roomed by TANGELA Sánchez   Accompanied by

## 2025-04-17 ENCOUNTER — OFFICE VISIT (OUTPATIENT)
Dept: FAMILY MEDICINE | Facility: CLINIC | Age: 50
End: 2025-04-17
Payer: COMMERCIAL

## 2025-04-17 VITALS
DIASTOLIC BLOOD PRESSURE: 82 MMHG | TEMPERATURE: 98.4 F | OXYGEN SATURATION: 92 % | WEIGHT: 192 LBS | RESPIRATION RATE: 16 BRPM | HEIGHT: 61 IN | BODY MASS INDEX: 36.25 KG/M2 | HEART RATE: 87 BPM | SYSTOLIC BLOOD PRESSURE: 131 MMHG

## 2025-04-17 DIAGNOSIS — Z12.31 SCREENING MAMMOGRAM FOR BREAST CANCER: ICD-10-CM

## 2025-04-17 DIAGNOSIS — Z12.11 SCREENING FOR COLON CANCER: ICD-10-CM

## 2025-04-17 DIAGNOSIS — G56.03 BILATERAL CARPAL TUNNEL SYNDROME: Primary | ICD-10-CM

## 2025-04-17 DIAGNOSIS — R03.0 ELEVATED BLOOD PRESSURE READING WITHOUT DIAGNOSIS OF HYPERTENSION: ICD-10-CM

## 2025-04-17 ASSESSMENT — ASTHMA QUESTIONNAIRES
QUESTION_1 LAST FOUR WEEKS HOW MUCH OF THE TIME DID YOUR ASTHMA KEEP YOU FROM GETTING AS MUCH DONE AT WORK, SCHOOL OR AT HOME: NONE OF THE TIME
QUESTION_1 LAST FOUR WEEKS HOW MUCH OF THE TIME DID YOUR ASTHMA KEEP YOU FROM GETTING AS MUCH DONE AT WORK, SCHOOL OR AT HOME: NONE OF THE TIME
QUESTION_2 LAST FOUR WEEKS HOW OFTEN HAVE YOU HAD SHORTNESS OF BREATH: NOT AT ALL
QUESTION_2 LAST FOUR WEEKS HOW OFTEN HAVE YOU HAD SHORTNESS OF BREATH: NOT AT ALL
ACT_TOTALSCORE: 25
QUESTION_4 LAST FOUR WEEKS HOW OFTEN HAVE YOU USED YOUR RESCUE INHALER OR NEBULIZER MEDICATION (SUCH AS ALBUTEROL): NOT AT ALL
QUESTION_5 LAST FOUR WEEKS HOW WOULD YOU RATE YOUR ASTHMA CONTROL: COMPLETELY CONTROLLED
QUESTION_5 LAST FOUR WEEKS HOW WOULD YOU RATE YOUR ASTHMA CONTROL: COMPLETELY CONTROLLED
QUESTION_3 LAST FOUR WEEKS HOW OFTEN DID YOUR ASTHMA SYMPTOMS (WHEEZING, COUGHING, SHORTNESS OF BREATH, CHEST TIGHTNESS OR PAIN) WAKE YOU UP AT NIGHT OR EARLIER THAN USUAL IN THE MORNING: NOT AT ALL
ACT_TOTALSCORE: 25
QUESTION_4 LAST FOUR WEEKS HOW OFTEN HAVE YOU USED YOUR RESCUE INHALER OR NEBULIZER MEDICATION (SUCH AS ALBUTEROL): NOT AT ALL
QUESTION_3 LAST FOUR WEEKS HOW OFTEN DID YOUR ASTHMA SYMPTOMS (WHEEZING, COUGHING, SHORTNESS OF BREATH, CHEST TIGHTNESS OR PAIN) WAKE YOU UP AT NIGHT OR EARLIER THAN USUAL IN THE MORNING: NOT AT ALL

## 2025-04-17 ASSESSMENT — PAIN SCALES - GENERAL: PAINLEVEL_OUTOF10: MILD PAIN (2)

## 2025-04-17 NOTE — NURSING NOTE
Prior to immunization administration, verified patients identity using patient s name and date of birth. Please see Immunization Activity for additional information.     Screening Questionnaire for Adult Immunization    Are you sick today?   No   Do you have allergies to medications, food, a vaccine component or latex?   No   Have you ever had a serious reaction after receiving a vaccination?   No   Do you have a long-term health problem with heart, lung, kidney, or metabolic disease (e.g., diabetes), asthma, a blood disorder, no spleen, complement component deficiency, a cochlear implant, or a spinal fluid leak?  Are you on long-term aspirin therapy?   Yes   Do you have cancer, leukemia, HIV/AIDS, or any other immune system problem?   No   Do you have a parent, brother, or sister with an immune system problem?   No   In the past 3 months, have you taken medications that affect  your immune system, such as prednisone, other steroids, or anticancer drugs; drugs for the treatment of rheumatoid arthritis, Crohn s disease, or psoriasis; or have you had radiation treatments?   No   Have you had a seizure, or a brain or other nervous system problem?   No   During the past year, have you received a transfusion of blood or blood    products, or been given immune (gamma) globulin or antiviral drug?   No   For women: Are you pregnant or is there a chance you could become       pregnant during the next month?   No   Have you received any vaccinations in the past 4 weeks?   No       Patient instructed to remain in clinic for 15 minutes afterwards, and to report any adverse reactions.     Screening performed by Emily Brock MA on 4/17/2025 at 3:55 PM.

## 2025-04-17 NOTE — PROGRESS NOTES
"  Assessment & Plan     Bilateral carpal tunnel syndrome  Evaluated in urgent care for bilateral carpal tunnel syndrome.  She has been using her braces and taking ibuprofen without much relief.  Discontinue ibuprofen, try diclofenac for 7 days.  She has an upcoming appointment with OT  Discussed RICE  - diclofenac (VOLTAREN) 50 MG EC tablet; Take 1 tablet (50 mg) by mouth 2 times daily as needed for moderate pain (Take twice a day for 7 days, then as needed. TAKE WITH FOOD).    Elevated blood pressure reading without diagnosis of hypertension  Her blood pressure is normal today in clinic.  She denies any symptoms.  She will monitor, she has upcoming physical and we will see if still stable    Screening mammogram for breast cancer  - MA Screening Bilateral w/ Sami; Future    Screening for colon cancer  - Colonoscopy Screening  Referral; Future    MED REC REQUIRED  Post Medication Reconciliation Status: discharge medications reconciled and changed, per note/orders  BMI  Estimated body mass index is 36.28 kg/m  as calculated from the following:    Height as of this encounter: 1.549 m (5' 1\").    Weight as of this encounter: 87.1 kg (192 lb).             Lidya Monk is a 50 year old, presenting for the following health issues:  Follow Up (Urgent Care follow up for numbness in fingers/discuss establishing care )      4/17/2025     3:08 PM   Additional Questions   Roomed by Roxanne   Accompanied by self         4/17/2025     3:08 PM   Patient Reported Additional Medications   Patient reports taking the following new medications no     HPI                Review of Systems  Constitutional, HEENT, cardiovascular, pulmonary, gi and gu systems are negative, except as otherwise noted.      Objective    BP (!) 140/87 (BP Location: Left arm, Patient Position: Sitting, Cuff Size: Adult Large)   Pulse 87   Temp 98.4  F (36.9  C) (Temporal)   Resp 16   Ht 1.549 m (5' 1\")   Wt 87.1 kg (192 lb)   SpO2 92%   BMI " 36.28 kg/m    Body mass index is 36.28 kg/m .  Physical Exam   GENERAL: alert and no distress  RESP: lungs clear to auscultation - no rales, rhonchi or wheezes  CV: regular rate and rhythm, normal S1 S2, no S3 or S4, no murmur, click or rub, no peripheral edema  MS: no gross musculoskeletal defects noted, no edema          Signed Electronically by: MAURI Corwley CNP

## 2025-04-22 ENCOUNTER — OFFICE VISIT (OUTPATIENT)
Dept: FAMILY MEDICINE | Facility: CLINIC | Age: 50
End: 2025-04-22
Payer: COMMERCIAL

## 2025-04-22 VITALS
BODY MASS INDEX: 39.43 KG/M2 | RESPIRATION RATE: 16 BRPM | SYSTOLIC BLOOD PRESSURE: 150 MMHG | TEMPERATURE: 97.6 F | HEIGHT: 59 IN | WEIGHT: 195.6 LBS | HEART RATE: 85 BPM | DIASTOLIC BLOOD PRESSURE: 87 MMHG | OXYGEN SATURATION: 97 %

## 2025-04-22 DIAGNOSIS — J45.30 MILD PERSISTENT ASTHMA WITHOUT COMPLICATION: ICD-10-CM

## 2025-04-22 DIAGNOSIS — Z11.4 SCREENING FOR HIV (HUMAN IMMUNODEFICIENCY VIRUS): ICD-10-CM

## 2025-04-22 DIAGNOSIS — Z11.59 NEED FOR HEPATITIS C SCREENING TEST: ICD-10-CM

## 2025-04-22 DIAGNOSIS — Z00.00 ROUTINE GENERAL MEDICAL EXAMINATION AT A HEALTH CARE FACILITY: Primary | ICD-10-CM

## 2025-04-22 DIAGNOSIS — Z23 NEED FOR SHINGLES VACCINE: ICD-10-CM

## 2025-04-22 DIAGNOSIS — Z13.31 POSITIVE SCREENING FOR DEPRESSION ON 2-ITEM PATIENT HEALTH QUESTIONNAIRE (PHQ-2): ICD-10-CM

## 2025-04-22 DIAGNOSIS — Z13.1 SCREENING FOR DIABETES MELLITUS: ICD-10-CM

## 2025-04-22 DIAGNOSIS — Z12.4 CERVICAL CANCER SCREENING: ICD-10-CM

## 2025-04-22 DIAGNOSIS — Z13.220 SCREENING FOR HYPERLIPIDEMIA: ICD-10-CM

## 2025-04-22 DIAGNOSIS — Z13.6 SCREENING FOR CARDIOVASCULAR CONDITION: ICD-10-CM

## 2025-04-22 DIAGNOSIS — I10 ESSENTIAL HYPERTENSION: ICD-10-CM

## 2025-04-22 DIAGNOSIS — G56.03 BILATERAL CARPAL TUNNEL SYNDROME: ICD-10-CM

## 2025-04-22 DIAGNOSIS — Z13.228 SCREENING FOR METABOLIC DISORDER: ICD-10-CM

## 2025-04-22 LAB
ERYTHROCYTE [DISTWIDTH] IN BLOOD BY AUTOMATED COUNT: 12.1 % (ref 10–15)
EST. AVERAGE GLUCOSE BLD GHB EST-MCNC: 120 MG/DL
HBA1C MFR BLD: 5.8 % (ref 0–5.6)
HCT VFR BLD AUTO: 39.4 % (ref 35–47)
HGB BLD-MCNC: 13.5 G/DL (ref 11.7–15.7)
MCH RBC QN AUTO: 29.3 PG (ref 26.5–33)
MCHC RBC AUTO-ENTMCNC: 34.3 G/DL (ref 31.5–36.5)
MCV RBC AUTO: 86 FL (ref 78–100)
PLATELET # BLD AUTO: 254 10E3/UL (ref 150–450)
RBC # BLD AUTO: 4.61 10E6/UL (ref 3.8–5.2)
WBC # BLD AUTO: 5.8 10E3/UL (ref 4–11)

## 2025-04-22 PROCEDURE — 99396 PREV VISIT EST AGE 40-64: CPT | Mod: 25 | Performed by: NURSE PRACTITIONER

## 2025-04-22 PROCEDURE — 86803 HEPATITIS C AB TEST: CPT | Performed by: NURSE PRACTITIONER

## 2025-04-22 PROCEDURE — 83036 HEMOGLOBIN GLYCOSYLATED A1C: CPT | Performed by: NURSE PRACTITIONER

## 2025-04-22 PROCEDURE — 99214 OFFICE O/P EST MOD 30 MIN: CPT | Mod: 25 | Performed by: NURSE PRACTITIONER

## 2025-04-22 PROCEDURE — 85027 COMPLETE CBC AUTOMATED: CPT | Performed by: NURSE PRACTITIONER

## 2025-04-22 PROCEDURE — 84443 ASSAY THYROID STIM HORMONE: CPT | Performed by: NURSE PRACTITIONER

## 2025-04-22 PROCEDURE — 87624 HPV HI-RISK TYP POOLED RSLT: CPT | Performed by: NURSE PRACTITIONER

## 2025-04-22 PROCEDURE — 36415 COLL VENOUS BLD VENIPUNCTURE: CPT | Performed by: NURSE PRACTITIONER

## 2025-04-22 PROCEDURE — 90750 HZV VACC RECOMBINANT IM: CPT | Performed by: NURSE PRACTITIONER

## 2025-04-22 PROCEDURE — 90471 IMMUNIZATION ADMIN: CPT | Performed by: NURSE PRACTITIONER

## 2025-04-22 PROCEDURE — 80048 BASIC METABOLIC PNL TOTAL CA: CPT | Performed by: NURSE PRACTITIONER

## 2025-04-22 PROCEDURE — 87389 HIV-1 AG W/HIV-1&-2 AB AG IA: CPT | Performed by: NURSE PRACTITIONER

## 2025-04-22 PROCEDURE — 80061 LIPID PANEL: CPT | Performed by: NURSE PRACTITIONER

## 2025-04-22 RX ORDER — LOSARTAN POTASSIUM 25 MG/1
25 TABLET ORAL DAILY
Qty: 60 TABLET | Refills: 0 | Status: SHIPPED | OUTPATIENT
Start: 2025-04-22

## 2025-04-22 SDOH — HEALTH STABILITY: PHYSICAL HEALTH: ON AVERAGE, HOW MANY DAYS PER WEEK DO YOU ENGAGE IN MODERATE TO STRENUOUS EXERCISE (LIKE A BRISK WALK)?: 0 DAYS

## 2025-04-22 SDOH — HEALTH STABILITY: PHYSICAL HEALTH: ON AVERAGE, HOW MANY MINUTES DO YOU ENGAGE IN EXERCISE AT THIS LEVEL?: 0 MIN

## 2025-04-22 ASSESSMENT — PATIENT HEALTH QUESTIONNAIRE - PHQ9
SUM OF ALL RESPONSES TO PHQ QUESTIONS 1-9: 14
SUM OF ALL RESPONSES TO PHQ QUESTIONS 1-9: 14
10. IF YOU CHECKED OFF ANY PROBLEMS, HOW DIFFICULT HAVE THESE PROBLEMS MADE IT FOR YOU TO DO YOUR WORK, TAKE CARE OF THINGS AT HOME, OR GET ALONG WITH OTHER PEOPLE: NOT DIFFICULT AT ALL

## 2025-04-22 ASSESSMENT — SOCIAL DETERMINANTS OF HEALTH (SDOH): HOW OFTEN DO YOU GET TOGETHER WITH FRIENDS OR RELATIVES?: ONCE A WEEK

## 2025-04-22 NOTE — PATIENT INSTRUCTIONS
Patient Education   Preventive Care Advice   This is general advice given by our system to help you stay healthy. However, your care team may have specific advice just for you. Please talk to your care team about your preventive care needs.  Nutrition  Eat 5 or more servings of fruits and vegetables each day.  Try wheat bread, brown rice and whole grain pasta (instead of white bread, rice, and pasta).  Get enough calcium and vitamin D. Check the label on foods and aim for 100% of the RDA (recommended daily allowance).  Lifestyle  Exercise at least 150 minutes each week  (30 minutes a day, 5 days a week).  Do muscle strengthening activities 2 days a week. These help control your weight and prevent disease.  No smoking.  Wear sunscreen to prevent skin cancer.  Have a dental exam and cleaning every 6 months.  Yearly exams  See your health care team every year to talk about:  Any changes in your health.  Any medicines your care team has prescribed.  Preventive care, family planning, and ways to prevent chronic diseases.  Shots (vaccines)   HPV shots (up to age 26), if you've never had them before.  Hepatitis B shots (up to age 59), if you've never had them before.  COVID-19 shot: Get this shot when it's due.  Flu shot: Get a flu shot every year.  Tetanus shot: Get a tetanus shot every 10 years.  Pneumococcal, hepatitis A, and RSV shots: Ask your care team if you need these based on your risk.  Shingles shot (for age 50 and up)  General health tests  Diabetes screening:  Starting at age 35, Get screened for diabetes at least every 3 years.  If you are younger than age 35, ask your care team if you should be screened for diabetes.  Cholesterol test: At age 39, start having a cholesterol test every 5 years, or more often if advised.  Bone density scan (DEXA): At age 50, ask your care team if you should have this scan for osteoporosis (brittle bones).  Hepatitis C: Get tested at least once in your life.  STIs (sexually  transmitted infections)  Before age 24: Ask your care team if you should be screened for STIs.  After age 24: Get screened for STIs if you're at risk. You are at risk for STIs (including HIV) if:  You are sexually active with more than one person.  You don't use condoms every time.  You or a partner was diagnosed with a sexually transmitted infection.  If you are at risk for HIV, ask about PrEP medicine to prevent HIV.  Get tested for HIV at least once in your life, whether you are at risk for HIV or not.  Cancer screening tests  Cervical cancer screening: If you have a cervix, begin getting regular cervical cancer screening tests starting at age 21.  Breast cancer scan (mammogram): If you've ever had breasts, begin having regular mammograms starting at age 40. This is a scan to check for breast cancer.  Colon cancer screening: It is important to start screening for colon cancer at age 45.  Have a colonoscopy test every 10 years (or more often if you're at risk) Or, ask your provider about stool tests like a FIT test every year or Cologuard test every 3 years.  To learn more about your testing options, visit:   .  For help making a decision, visit:   https://bit.ly/vz99955.  Prostate cancer screening test: If you have a prostate, ask your care team if a prostate cancer screening test (PSA) at age 55 is right for you.  Lung cancer screening: If you are a current or former smoker ages 50 to 80, ask your care team if ongoing lung cancer screenings are right for you.  For informational purposes only. Not to replace the advice of your health care provider. Copyright   2023 [employer]. All rights reserved. Clinically reviewed by the [facility] Transitions Program. [employer] [id number] - REV 01/24.

## 2025-04-22 NOTE — LETTER
April 23, 2025      Aleshia Land  [address] [facility] [place] [address]    Ann Marie Land     Your LDL (bad cholesterol)  was above goal.  Genetics, diet, weight and low exercise levels can contribute to this. Maintaining a healthy diet with lean proteins, whole grains and healthy fats such as olive oil as well as regular exercise and maintaining an appropriate weight all contribute to healthier cholesterol levels.     Your average blood sugar is higher than it should be, but you do not have diabetes. You have PREDIABETES. This means that you have a 5-7% chance of developing diabetes in the next 5 years. Exercising, eating a healthy diet, and losing weight will help keep you from getting diabetes.   Your TSH indicates that your thyroid function is currently in balance.      Your kidney function was normal.   Your blood count is normal.  There is no anemia or abnormalities suggesting infection or other problems.   Your test for hepatitis C virus and HIV was negative. You do not have evidence of hepatitis C or HIV infection.     Please let me know if any questions.     China Rucker   HIV Antigen Antibody Combo   Result Value Ref Range    HIV Antigen Antibody Combo Nonreactive Nonreactive      Comment:      Negative HIV-1 p24 antigen and HIV-1/2 antibody screening test results usually indicate the absence of HIV-1 and HIV-2 infection. However, such negative results do not rule-out acute HIV infection.  If acute HIV-1 or HIV-2 infection is suspected, detection of HIV-1 or HIV-2 RNA  is recommended. This result is obtained using the Roche Elecsys HIV Duo method on the enrrique e801 immunoassay analyzer.   Hepatitis C Screen Reflex to HCV RNA Quant and Genotype   Result Value Ref Range    Hepatitis C Antibody Nonreactive Nonreactive      Comment:      A nonreactive screening test result does not exclude the possibility of exposure to or infection with HCV. Nonreactive screening test results in  individuals with prior exposure to HCV may be due to antibody levels below the limit of detection of this assay or lack of reactivity to the HCV antigens used in this assay. Patients with recent HCV infections (<3 months from time of exposure) may have false-negative HCV antibody results due to the time needed for seroconversion (average of 8 to 9 weeks).   Lipid panel reflex to direct LDL Non-fasting   Result Value Ref Range    Cholesterol 187 <200 mg/dL    Triglycerides 113 <150 mg/dL    Direct Measure HDL 54 >=50 mg/dL    LDL Cholesterol Calculated 110 (H) <100 mg/dL    Non HDL Cholesterol 133 (H) <130 mg/dL    Patient Fasting > 8hrs? No     Narrative    Cholesterol  Desirable: < 200 mg/dL  Borderline High: 200 - 239 mg/dL  High: >= 240 mg/dL    Triglycerides  Normal: < 150 mg/dL  Borderline High: 150 - 199 mg/dL  High: 200-499 mg/dL  Very High: >= 500 mg/dL    Direct Measure HDL  Female: >= 50 mg/dL   Male: >= 40 mg/dL    LDL Cholesterol  Desirable: < 100 mg/dL  Above Desirable: 100 - 129 mg/dL   Borderline High: 130 - 159 mg/dL   High:  160 - 189 mg/dL   Very High: >= 190 mg/dL    Non HDL Cholesterol  Desirable: < 130 mg/dL  Above Desirable: 130 - 159 mg/dL  Borderline High: 160 - 189 mg/dL  High: 190 - 219 mg/dL  Very High: >= 220 mg/dL   Basic metabolic panel  (Ca, Cl, CO2, Creat, Gluc, K, Na, BUN)   Result Value Ref Range    Sodium 137 135 - 145 mmol/L    Potassium 4.0 3.4 - 5.3 mmol/L    Chloride 102 98 - 107 mmol/L    Carbon Dioxide (CO2) 24 22 - 29 mmol/L    Anion Gap 11 7 - 15 mmol/L    Urea Nitrogen 15.0 6.0 - 20.0 mg/dL    Creatinine 0.72 0.51 - 0.95 mg/dL    GFR Estimate >90 >60 mL/min/1.73m2      Comment:      eGFR calculated using 2021 CKD-EPI equation.    Calcium 9.0 8.8 - 10.4 mg/dL    Glucose 90 70 - 99 mg/dL    Patient Fasting > 8hrs? No    Hemoglobin A1c   Result Value Ref Range    Estimated Average Glucose 120 (H) <117 mg/dL    Hemoglobin A1C 5.8 (H) 0.0 - 5.6 %      Comment:      Normal <5.7%    Prediabetes 5.7-6.4%    Diabetes 6.5% or higher     Note: Adopted from ADA consensus guidelines.   CBC with platelets   Result Value Ref Range    WBC Count 5.8 4.0 - 11.0 10e3/uL    RBC Count 4.61 3.80 - 5.20 10e6/uL    Hemoglobin 13.5 11.7 - 15.7 g/dL    Hematocrit 39.4 35.0 - 47.0 %    MCV 86 78 - 100 fL    MCH 29.3 26.5 - 33.0 pg    MCHC 34.3 31.5 - 36.5 g/dL    RDW 12.1 10.0 - 15.0 %    Platelet Count 254 150 - 450 10e3/uL   TSH with free T4 reflex   Result Value Ref Range    TSH 2.54 0.30 - 4.20 uIU/mL

## 2025-04-22 NOTE — PROGRESS NOTES
"Preventive Care Visit  [facility]  China Lacy, MAURI CNP, Family Medicine  Apr 22, 2025      Assessment & Plan     Routine general medical examination at a health care facility  - PRIMARY CARE FOLLOW-UP SCHEDULING; Future    Mild persistent asthma without complication  No current symptoms. Stable    Bilateral carpal tunnel syndrome  Reports pain improving with NSAIDS and brace but still bothersome  - Orthopedic  Referral; Future    Essential hypertension  Elevated, new dx- recently high in UC. Does not report any headaches, blurry vision, dizziness, chest pain, shortness of breath, or palpitations.  Discussed DASH diet and dietary sodium restrictions. Increase dietary efforts and physical activity.  - losartan (COZAAR) 25 MG tablet; Take 1 tablet (25 mg) by mouth daily.  - PRIMARY CARE FOLLOW-UP SCHEDULING; Future    Positive screening for depression on 2-item Patient Health Questionnaire (PHQ-2)  Not attending psychotherapy. She reports her symptoms are related to the \"political situation\", declines mental health referral. No suicidal ideations.     Screening for HIV (human immunodeficiency virus)  - HIV Antigen Antibody Combo; Future    Need for hepatitis C screening test  - Hepatitis C Screen Reflex to HCV RNA Quant and Genotype; Future    Cervical cancer screening  - HPV and Gynecologic Cytology Panel - Recommended Age 30 - 65 Years    Screening for metabolic disorder  - Basic metabolic panel  (Ca, Cl, CO2, Creat, Gluc, K, Na, BUN); Future  - Hemoglobin A1c; Future  - CBC with platelets; Future  - TSH with free T4 reflex; Future    Screening for hyperlipidemia  - Lipid panel reflex to direct LDL Non-fasting; Future    Need for shingles vaccine  - ZOSTER RECOMBINANT ADJUVANTED (SHINGRIX)    Patient has been advised of split billing requirements and indicates understanding: Yes    BMI  Estimated body mass index is 39.96 kg/m  as calculated from the following:    Height as " "of this encounter: 1.49 m (4' 10.66\").    Weight as of this encounter: 88.7 kg (195 lb 9.6 oz).       Depression Screening Follow Up        4/22/2025     4:14 PM   PHQ   PHQ-9 Total Score 14    Q9: Thoughts of better off dead/self-harm past 2 weeks Not at all       Patient-reported           Follow Up Actions Taken  Crisis resource information provided in After Visit Summary  Patient declined referral.     Counseling  Appropriate preventive services were addressed with this patient via screening, questionnaire, or discussion as appropriate for fall prevention, nutrition, physical activity, Tobacco-use cessation, social engagement, weight loss and cognition.  Checklist reviewing preventive services available has been given to the patient.  Reviewed patient's diet, addressing concerns and/or questions.   The patient was instructed to see the dentist every 6 months.   The patient's PHQ-9 score is consistent with moderate depression. She was provided with information regarding depression.           Lidya Monk is a 50 year old, presenting for the following:  Physical        4/22/2025     4:09 PM   Additional Questions   Roomed by lópez   Accompanied by self         4/22/2025     4:09 PM   Patient Reported Additional Medications   Patient reports taking the following new medications no          HPI  Annual physical, HTN, carpal tunnel         Advance Care Planning    Discussed advance care planning with patient; however, patient declined at this time.        4/22/2025   General Health   How would you rate your overall physical health? Good   Feel stress (tense, anxious, or unable to sleep) Only a little   (!) STRESS CONCERN      4/22/2025   Nutrition   Three or more servings of calcium each day? Yes   Diet: Regular (no restrictions)   How many servings of fruit and vegetables per day? (!) 2-3   How many sweetened beverages each day? (!) 2         4/22/2025   Exercise   Days per week of moderate/strenous exercise " 0 days   Average minutes spent exercising at this level 0 min   (!) EXERCISE CONCERN      4/22/2025   Social Factors   Frequency of gathering with friends or relatives Once a week   Worry food won't last until get money to buy more No   Food not last or not have enough money for food? No   Do you have housing? (Housing is defined as stable permanent housing and does not include staying ouside in a car, in a tent, in an abandoned building, in an overnight shelter, or couch-surfing.) No   Are you worried about losing your housing? No   Lack of transportation? No   Unable to get utilities (heat,electricity)? No   Want help with housing or utility concern? No   (!) HOUSING CONCERN PRESENT      4/22/2025   Fall Risk   Fallen 2 or more times in the past year? No   Trouble with walking or balance? No          4/22/2025   Dental   Dentist two times every year? (!) NO       Today's PHQ-9 Score:       4/22/2025     4:14 PM   PHQ-9 SCORE   PHQ-9 Total Score MyChart 14 (Moderate depression)   PHQ-9 Total Score 14        Patient-reported         4/22/2025   Substance Use   Alcohol more than 3/day or more than 7/wk No   Do you use any other substances recreationally? No     Social History     Tobacco Use    Smoking status: Never     Passive exposure: Never   Vaping Use    Vaping status: Never Used   Substance Use Topics    Alcohol use: No    Drug use: No                    4/22/2025   One time HIV Screening   Previous HIV test? Yes         4/22/2025   STI Screening   New sexual partner(s) since last STI/HIV test? No     History of abnormal Pap smear: No - age 30- 64 PAP with HPV every 5 years recommended       ASCVD Risk   The ASCVD Risk score (Lolly CABRALES, et al., 2019) failed to calculate for the following reasons:    Cannot find a previous HDL lab    Cannot find a previous total cholesterol lab            4/22/2025   Contraception/Family Planning   Questions about contraception or family planning No        Reviewed and  "updated as needed this visit by Provider                          Review of Systems  Constitutional, HEENT, cardiovascular, pulmonary, GI, , musculoskeletal, neuro, skin, endocrine and psych systems are negative, except as otherwise noted.     Objective    Exam  BP (!) 150/87 (BP Location: Right arm, Patient Position: Sitting, Cuff Size: Adult Large)   Pulse 85   Temp 97.6  F (36.4  C) (Temporal)   Resp 16   Ht 1.49 m (4' 10.66\")   Wt 88.7 kg (195 lb 9.6 oz)   SpO2 97%   BMI 39.96 kg/m     Estimated body mass index is 39.96 kg/m  as calculated from the following:    Height as of this encounter: 1.49 m (4' 10.66\").    Weight as of this encounter: 88.7 kg (195 lb 9.6 oz).    Physical Exam  GENERAL: alert and no distress  EYES: Eyes grossly normal to inspection, PERRL and conjunctivae and sclerae normal  HENT: ear canals and TM's normal, nose and mouth without ulcers or lesions  NECK: no adenopathy, no asymmetry, masses, or scars  RESP: lungs clear to auscultation - no rales, rhonchi or wheezes  CV: regular rate and rhythm, normal S1 S2, no S3 or S4, no murmur, click or rub, no peripheral edema  ABDOMEN: soft, nontender, no hepatosplenomegaly, no masses and bowel sounds normal   (female) w/bimanual: normal female external genitalia, normal urethral meatus, normal vaginal mucosa, and normal cervix/adnexa/uterus without masses or discharge  MS: no gross musculoskeletal defects noted, no edema  SKIN: no suspicious lesions or rashes  NEURO: Normal strength and tone, mentation intact and speech normal  PSYCH: mentation appears normal, affect normal/bright        Signed Electronically by: MAURI Crowley CNP    Answers submitted by the patient for this visit:  Patient Health Questionnaire (Submitted on 4/22/2025)  If you checked off any problems, how difficult have these problems made it for you to do your work, take care of things at home, or get along with other people?: Not difficult at all  PHQ9 TOTAL " SCORE: 14

## 2025-04-22 NOTE — LETTER
My Asthma Action Plan    Name: Aleshia Land   YOB: 1975  Date: 4/22/2025   My doctor: MAURI Crowley CNP   My clinic: [facility]        My Rescue Medicine: Albuterol (Proair/Ventolin/Proventil HFA) 2-4 puffs EVERY 4 HOURS as needed. Use a spacer if recommended by your provider.   My Asthma Severity:   Intermittent / Exercise Induced  Know your asthma triggers: strong odors and fumes             GREEN ZONE   Good Control  I feel good  No cough or wheeze  Can work, sleep and play without asthma symptoms       Take your asthma control medicine every day.     If exercise triggers your asthma, take your rescue medication  15 minutes before exercise or sports, and  During exercise if you have asthma symptoms  Spacer to use with inhaler: If you have a spacer, make sure to use it with your inhaler             YELLOW ZONE Getting Worse  I have ANY of these:  I do not feel good  Cough or wheeze  Chest feels tight  Wake up at night   Keep taking your Green Zone medications  Start taking your rescue medicine:  every 20 minutes for up to 1 hour. Then every 4 hours for 24-48 hours.  If you stay in the Yellow Zone for more than 12-24 hours, contact your doctor.  If you do not return to the Green Zone in 12-24 hours or you get worse, start taking your oral steroid medicine if prescribed by your provider.           RED ZONE Medical Alert - Get Help  I have ANY of these:  I feel awful  Medicine is not helping  Breathing getting harder  Trouble walking or talking  Nose opens wide to breathe       Take your rescue medicine NOW  If your provider has prescribed an oral steroid medicine, start taking it NOW  Call your doctor NOW  If you are still in the Red Zone after 20 minutes and you have not reached your doctor:  Take your rescue medicine again and  Call 911 or go to the emergency room right away    See your regular doctor within 2 weeks of an Emergency Room or Urgent Care visit  for follow-up treatment.          Annual Reminders:  Meet with Asthma Educator,  Flu Shot in the Fall, consider Pneumonia Vaccination for patients with asthma (aged 19 and older).    Pharmacy: [id number], [place] - [address]    Electronically signed by MAURI Crowley CNP   Date: 04/22/25                    Asthma Triggers  How To Control Things That Make Your Asthma Worse    Triggers are things that make your asthma worse.  Look at the list below to help you find your triggers and   what you can do about them. You can help prevent asthma flare-ups by staying away from your triggers.      Trigger                                                          What you can do   Cigarette Smoke  Tobacco smoke can make asthma worse. Do not allow smoking in your home, car or around you.  Be sure no one smokes at a child s day care or school.  If you smoke, ask your health care provider for ways to help you quit.  Ask family members to quit too.  Ask your health care provider for a referral to Quit Plan to help you quit smoking, or call 0-974-796-PLAN.     Colds, Flu, Bronchitis  These are common triggers of asthma. Wash your hands often.  Don t touch your eyes, nose or mouth.  Get a flu shot every year.     Dust Mites  These are tiny bugs that live in cloth or carpet. They are too small to see. Wash sheets and blankets in hot water every week.   Encase pillows and mattress in dust mite proof covers.  Avoid having carpet if you can. If you have carpet, vacuum weekly.   Use a dust mask and HEPA vacuum.   Pollen and Outdoor Mold  Some people are allergic to trees, grass, or weed pollen, or molds. Try to keep your windows closed.  Limit time out doors when pollen count is high.   Ask you health care provider about taking medicine during allergy season.     Animal Dander  Some people are allergic to skin flakes, urine or saliva from pets with fur or  feathers. Keep pets with fur or feathers out of your home.    If you can t keep the pet outdoors, then keep the pet out of your bedroom.  Keep the bedroom door closed.  Keep pets off cloth furniture and away from stuffed toys.     Mice, Rats, and Cockroaches  Some people are allergic to the waste from these pests.   Cover food and garbage.  Clean up spills and food crumbs.  Store grease in the refrigerator.   Keep food out of the bedroom.   Indoor Mold  This can be a trigger if your home has high moisture. Fix leaking faucets, pipes, or other sources of water.   Clean moldy surfaces.  Dehumidify basement if it is damp and smelly.   Smoke, Strong Odors, and Sprays  These can reduce air quality. Stay away from strong odors and sprays, such as perfume, powder, hair spray, paints, smoke incense, paint, cleaning products, candles and new carpet.   Exercise or Sports  Some people with asthma have this trigger. Be active!  Ask your doctor about taking medicine before sports or exercise to prevent symptoms.    Warm up for 5-10 minutes before and after sports or exercise.     Other Triggers of Asthma  Cold air:  Cover your nose and mouth with a scarf.  Sometimes laughing or crying can be a trigger.  Some medicines and food can trigger asthma.

## 2025-04-23 LAB
ANION GAP SERPL CALCULATED.3IONS-SCNC: 11 MMOL/L (ref 7–15)
BUN SERPL-MCNC: 15 MG/DL (ref 6–20)
CALCIUM SERPL-MCNC: 9 MG/DL (ref 8.8–10.4)
CHLORIDE SERPL-SCNC: 102 MMOL/L (ref 98–107)
CHOLEST SERPL-MCNC: 187 MG/DL
CREAT SERPL-MCNC: 0.72 MG/DL (ref 0.51–0.95)
EGFRCR SERPLBLD CKD-EPI 2021: >90 ML/MIN/1.73M2
FASTING STATUS PATIENT QL REPORTED: NO
FASTING STATUS PATIENT QL REPORTED: NO
GLUCOSE SERPL-MCNC: 90 MG/DL (ref 70–99)
HCO3 SERPL-SCNC: 24 MMOL/L (ref 22–29)
HCV AB SERPL QL IA: NONREACTIVE
HDLC SERPL-MCNC: 54 MG/DL
HIV 1+2 AB+HIV1 P24 AG SERPL QL IA: NONREACTIVE
HPV HR 12 DNA CVX QL NAA+PROBE: NEGATIVE
HPV16 DNA CVX QL NAA+PROBE: NEGATIVE
HPV18 DNA CVX QL NAA+PROBE: NEGATIVE
HUMAN PAPILLOMA VIRUS FINAL DIAGNOSIS: NORMAL
LDLC SERPL CALC-MCNC: 110 MG/DL
NONHDLC SERPL-MCNC: 133 MG/DL
POTASSIUM SERPL-SCNC: 4 MMOL/L (ref 3.4–5.3)
SODIUM SERPL-SCNC: 137 MMOL/L (ref 135–145)
TRIGL SERPL-MCNC: 113 MG/DL
TSH SERPL DL<=0.005 MIU/L-ACNC: 2.54 UIU/ML (ref 0.3–4.2)

## 2025-04-24 NOTE — TELEPHONE ENCOUNTER
REASON FOR VISIT: Bilateral carpal tunnel syndrome    DATE OF APPT: 5/10/2025   NOTES (FOR ALL VISITS) STATUS DETAILS   OFFICE NOTE from referring provider [facility] China Sheehan APRN CNP 4/22/2025   EMG N/A    MEDICATION LIST N/A    IMAGING  (FOR ALL VISITS)     XR N/A    MRI (HEAD, NECK, SPINE) N/A    CT (HEAD, NECK, SPINE) N/A

## 2025-05-10 ENCOUNTER — PRE VISIT (OUTPATIENT)
Dept: ORTHOPEDICS | Facility: CLINIC | Age: 50
End: 2025-05-10

## 2025-05-10 ENCOUNTER — OFFICE VISIT (OUTPATIENT)
Dept: ORTHOPEDICS | Facility: CLINIC | Age: 50
End: 2025-05-10
Attending: NURSE PRACTITIONER
Payer: COMMERCIAL

## 2025-05-10 DIAGNOSIS — G56.03 BILATERAL CARPAL TUNNEL SYNDROME: ICD-10-CM

## 2025-05-10 PROCEDURE — 99204 OFFICE O/P NEW MOD 45 MIN: CPT | Performed by: FAMILY MEDICINE

## 2025-05-10 ASSESSMENT — PAIN SCALES - GENERAL: PAINLEVEL_OUTOF10: MODERATE PAIN (6)

## 2025-05-10 NOTE — LETTER
5/10/2025      [facility]  [address] [facility] [place] [address]      Dear Colleague,    Thank you for referring your patient, Aleshia Land, to the [facility] [place]. Please see a copy of my visit note below.    CHIEF COMPLAINT:  Pain and New Patient of the Left Wrist; Pain and New Patient of the Right Wrist; Pain and Numbness of the Right Hand; and Pain, Numbness, and New Patient of the Left Hand       HISTORY OF PRESENT ILLNESS  Ms. Land is a pleasant 50 year old year old female who presents to clinic today with bilateral CTS.  Aleshia explains that Hand/wrist pain and numbness for the past 3 months without inciting event or trauma.    She initially had tingling and now with pain at wrist and hands. Left hand with persistent tingling of digits 1-3.  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      She was seen and evaluated in urgent care on/3/25.  At that time she was provided braces.  She was given an occupational hand therapy referral.  She was also more recently started on diclofenac to be taken twice daily for 1 week after seeing her primary care provider.  Her PCP also provided her with additional recommendations on following up with us here in sports medicine.    She notes that the brace is a provide modest benefit.  She had her first OT session yesterday which she found helpful, but unsure whether this is helping thus far.    Onset: gradual  Location: bilateral wrist  Quality:  sharp and numb  Duration: 3 months   Severity: 5/10 at worst  Timing:constant  Modifying factors:  resting and non-use makes it better, movement and use makes it worse  Associated signs & symptoms: pain  Previous similar pain: No  Treatments to date:Medication and wrist braces    Additional history: as documented    Review of Systems:  Have you recently had a a fever, chills, weight loss? No  Do you have any vision problems? No  Do you have  any chest pain or edema? No  Do you have any shortness of breath or wheezing?  No  Do you have stomach problems? Yes, from new pain med  Do you have any numbness or focal weakness? No  Do you have diabetes? No  Do you have problems with bleeding or clotting? No  Do you have an rashes or other skin lesions? No    MEDICAL HISTORY  Patient Active Problem List   Diagnosis     Mild persistent asthma without complication       Current Outpatient Medications   Medication Sig Dispense Refill     albuterol (PROAIR HFA/PROVENTIL HFA/VENTOLIN HFA) 108 (90 BASE) MCG/ACT Inhaler Inhale 2 puffs into the lungs every 4 hours as needed (Patient not taking: Reported on 4/22/2025) 1 Inhaler 3     diclofenac (VOLTAREN) 50 MG EC tablet Take 1 tablet (50 mg) by mouth 2 times daily as needed for moderate pain (Take twice a day for 7 days, then as needed. TAKE WITH FOOD). 60 tablet 0     losartan (COZAAR) 25 MG tablet Take 1 tablet (25 mg) by mouth daily. 60 tablet 0       No Known Allergies    Family History   Problem Relation Age of Onset     Diabetes Mother        Additional medical/Social/Surgical histories reviewed in EPIC and updated as appropriate.       PHYSICAL EXAM  There were no vitals taken for this visit.    General  - normal appearance, in no obvious distress  CV  - normal radial pulse  Pulm  - normal respiratory pattern, non-labored  Musculoskeletal - bilateral wrist  - inspection: No thenar atrophy, normal joint alignment, no swelling  - palpation: no bony or soft tissue tenderness, no tenderness at the anatomical snuffbox  - ROM:  90 deg flexion   70 deg extension   25 deg abduction   65 deg adduction  - strength: 5/5 A-OK sign, 5/5 Palmar abduction of thumb intact, 5/5 hand intrinsics,   - special tests:  (+) Tinel's bilateral  (-) Finkelstein  (+) Phalen bilateral  (+) Median nerve compression test bilateral  (-) Maier click test  Neuro  -No sensory disturbance, no motor deficit, grossly normal coordination, normal  muscle tone  Skin  - no ecchymosis, erythema, warmth, or induration, no obvious rash  Psych  - interactive, appropriate, normal mood and affect    IMAGING : Deferred    NOTES REVIEWED:   4/17/25 family practice note reviewed  4/3/25 urgent care note reviewed  5/9/25 hand therapy note with Mildred Smith     ASSESSMENT & PLAN  Ms. Land is a 50 year old year old female who presents to clinic today with chronic bilateral L>R wrist and hand paresthesias over the last 3 months.    Clinic examination today as well as history are consistent with classic carpal tunnel syndrome left greater than right affected.  I do have some concerns given the persistent nature of her left symptoms despite bracing, anti-inflammatories over the past 3 months as well as activity modification.  Fortunately the symptoms on her right wrist are more intermittent.    Diagnosis: Carpal tunnel syndrome of bilateral wrist    Additional diagnostic as well as treatment measures were both discussed.  I would like her to continue on with her hand therapy and follow along with her home exercise program.  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  We also discussed refill for diclofenac which I prescribed today and she can use as needed because it does help with the pain, however not the tingling and numbness in her left hand.  Lastly we discussed an EMG is the best means to confirm compression at the carpal tunnel region versus other.  It would also provide us additional information on the severity of her symptoms on the left especially given persistent symptoms over the past few months.  EMG is ordered and I would like her to follow-up after EMG is completed to discuss progress if OT has been helping and she no longer has persistent symptoms, as well as to further interpret EMG results and provide a new plan.    It was a pleasure seeing Aleshia  today.    Rashi Tyson DO, ALEXA    Primary Care Sports Medicine  Department of Orthopedic Surgery  [facility]     Again, thank you for allowing me to participate in the care of your patient.        Sincerely,        Rashi Tyson DO    Electronically signed

## 2025-05-10 NOTE — NURSING NOTE
Chief Complaint   Patient presents with    Left Wrist - Pain, New Patient    Right Wrist - Pain, New Patient    Right Hand - Pain, Numbness    Left Hand - Pain, Numbness, New Patient       There were no vitals filed for this visit.    There is no height or weight on file to calculate BMI.      KOFI Mcclain NREMT

## 2025-05-10 NOTE — PROGRESS NOTES
CHIEF COMPLAINT:  Pain and New Patient of the Left Wrist; Pain and New Patient of the Right Wrist; Pain and Numbness of the Right Hand; and Pain, Numbness, and New Patient of the Left Hand       HISTORY OF PRESENT ILLNESS  Ms. Land is a pleasant 50 year old year old female who presents to clinic today with bilateral CTS.  Aleshia explains that Hand/wrist pain and numbness for the past 3 months without inciting event or trauma.    She initially had tingling and now with pain at wrist and hands. Left hand with persistent tingling of digits 1-3.  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      She was seen and evaluated in urgent care on/3/25.  At that time she was provided braces.  She was given an occupational hand therapy referral.  She was also more recently started on diclofenac to be taken twice daily for 1 week after seeing her primary care provider.  Her PCP also provided her with additional recommendations on following up with us here in sports medicine.    She notes that the brace is a provide modest benefit.  She had her first OT session yesterday which she found helpful, but unsure whether this is helping thus far.    Onset: gradual  Location: bilateral wrist  Quality:  sharp and numb  Duration: 3 months   Severity: 5/10 at worst  Timing:constant  Modifying factors:  resting and non-use makes it better, movement and use makes it worse  Associated signs & symptoms: pain  Previous similar pain: No  Treatments to date:Medication and wrist braces    Additional history: as documented    Review of Systems:  Have you recently had a a fever, chills, weight loss? No  Do you have any vision problems? No  Do you have any chest pain or edema? No  Do you have any shortness of breath or wheezing?  No  Do you have stomach problems? Yes, from new pain med  Do you have any numbness or focal weakness? No  Do you have diabetes? No  Do you have problems with bleeding or clotting?  No  Do you have an rashes or other skin lesions? No    MEDICAL HISTORY  Patient Active Problem List   Diagnosis    Mild persistent asthma without complication       Current Outpatient Medications   Medication Sig Dispense Refill    albuterol (PROAIR HFA/PROVENTIL HFA/VENTOLIN HFA) 108 (90 BASE) MCG/ACT Inhaler Inhale 2 puffs into the lungs every 4 hours as needed (Patient not taking: Reported on 4/22/2025) 1 Inhaler 3    diclofenac (VOLTAREN) 50 MG EC tablet Take 1 tablet (50 mg) by mouth 2 times daily as needed for moderate pain (Take twice a day for 7 days, then as needed. TAKE WITH FOOD). 60 tablet 0    losartan (COZAAR) 25 MG tablet Take 1 tablet (25 mg) by mouth daily. 60 tablet 0       No Known Allergies    Family History   Problem Relation Age of Onset    Diabetes Mother        Additional medical/Social/Surgical histories reviewed in EPIC and updated as appropriate.       PHYSICAL EXAM  There were no vitals taken for this visit.    General  - normal appearance, in no obvious distress  CV  - normal radial pulse  Pulm  - normal respiratory pattern, non-labored  Musculoskeletal - bilateral wrist  - inspection: No thenar atrophy, normal joint alignment, no swelling  - palpation: no bony or soft tissue tenderness, no tenderness at the anatomical snuffbox  - ROM:  90 deg flexion   70 deg extension   25 deg abduction   65 deg adduction  - strength: 5/5 A-OK sign, 5/5 Palmar abduction of thumb intact, 5/5 hand intrinsics,   - special tests:  (+) Tinel's bilateral  (-) Finkelstein  (+) Phalen bilateral  (+) Median nerve compression test bilateral  (-) Maier click test  Neuro  -No sensory disturbance, no motor deficit, grossly normal coordination, normal muscle tone  Skin  - no ecchymosis, erythema, warmth, or induration, no obvious rash  Psych  - interactive, appropriate, normal mood and affect    IMAGING : Deferred    NOTES REVIEWED:   4/17/25 family practice note reviewed  4/3/25 urgent care note reviewed  5/9/25  hand therapy note with Mildred Smith     ASSESSMENT & PLAN  Ms. Land is a 50 year old year old female who presents to clinic today with chronic bilateral L>R wrist and hand paresthesias over the last 3 months.    Clinic examination today as well as history are consistent with classic carpal tunnel syndrome left greater than right affected.  I do have some concerns given the persistent nature of her left symptoms despite bracing, anti-inflammatories over the past 3 months as well as activity modification.  Fortunately the symptoms on her right wrist are more intermittent.    Diagnosis: Carpal tunnel syndrome of bilateral wrist    Additional diagnostic as well as treatment measures were both discussed.  I would like her to continue on with her hand therapy and follow along with her home exercise program.  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  We also discussed refill for diclofenac which I prescribed today and she can use as needed because it does help with the pain, however not the tingling and numbness in her left hand.  Lastly we discussed an EMG is the best means to confirm compression at the carpal tunnel region versus other.  It would also provide us additional information on the severity of her symptoms on the left especially given persistent symptoms over the past few months.  EMG is ordered and I would like her to follow-up after EMG is completed to discuss progress if OT has been helping and she no longer has persistent symptoms, as well as to further interpret EMG results and provide a new plan.    It was a pleasure seeing Aleshia today.    Rashi Tyson DO, CAQSM    Primary Care Sports Medicine  Department of Orthopedic Surgery  [facility]

## 2025-05-14 ENCOUNTER — OFFICE VISIT (OUTPATIENT)
Dept: NEUROLOGY | Facility: CLINIC | Age: 50
End: 2025-05-14
Attending: FAMILY MEDICINE
Payer: COMMERCIAL

## 2025-05-14 DIAGNOSIS — G56.03 BILATERAL CARPAL TUNNEL SYNDROME: ICD-10-CM

## 2025-05-14 NOTE — PROGRESS NOTES
[facility]  Electrodiagnostic Laboratory                 Department of Neurology                                                                                                         Test Date:  2025    Patient: Aleshia Land : 1975 Physician: Johny Tubbs MD   Sex: Female AGE: 50 year Ref Phys: Rashi Tyson,    ID#: [id number]   Technician: Shruthi Giron     History and Examination:  50 year old with several months of intermittent numbness and paresthesias in the fingers. The first 3 fingers are most affected. The left side is more affected than the right. She also reports neck pain. This study is being performed to investigate for radiculopathy vs focal neuropathy.     Techniques:  Motor and sensory conduction studies were done with surface recording electrodes.  EMG was done with a concentric needle electrode.     Results  Nerve conduction studies:  1. Left median-D2 sensory response shows severely slowed CV and mildly reduced amplitude.   2. Right median-D2 sensory response shows severely slowed CV and normal amplitude.   3. Bilateral ulnar-D5 sensory responses are normal.   4. Bilateral median-ulnar palmar interlatency differences are prolonged.   5. Bilateral median-APB motor responses show prolonged DL, normal amplitude and normal CV in the forearm.   6. Bilateral ulnar-ADM motor responses are normal.   7. Bilateral median-lumbrical vs ulnar-interosseous latency differences are prolonged.     Needle EMG of selected proximal and distal right and left upper limb muscles was performed as tabulated below. No abnormal spontaneous activity was observed in the sampled muscles. Motor unit potential morphology and recruitment patterns were normal.     Interpretation:  This is an abnormal study. 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 There is no evidence of a cervical radiculopathy affecting the upper limbs on the basis of this study. Clinical correlation is recommended.       Johny Tubbs MD  Department of Neurology        Nerve Conduction Studies  Motor Sites      Latency Neg. Amp Neg. Amp Diff Segment Distance Velocity Neg. Dur Neg Area Diff Temperature Comment   Site (ms) Norm (mV) Norm (%)  cm m/s Norm (ms) (%) ( C)    Left Median (APB) Motor        Median (APB)   Wrist 6.6  < 4.4 6.1  > 5.0  Wrist-APB 7.8   6.0  33.2    Elbow 9.7 - 6.5  > 5.0 7 Elbow-Wrist 17 55  > 48 6.2 6 33.1         Ulnar (APB)   Wrist+1 3.0 - 6.4 -      5.5  33.4    Right Median (APB) Motor   Wrist 6.4  < 4.4 6.9  > 5.0  Wrist-APB 8   5.5  31    Elbow 6.9 - 6.4  > 5.0 -7 Elbow-Wrist 18 69  > 48 8.4 6 31    Left Median/Ulnar (Lumb-Dors Int II) Motor        Median (Lumb I)   Wrist 6.8 - 0.17 -  Wrist-Lumb I 10   7.5  33         Ulnar (Dors int II (pedis))   Wrist 3.1 - 0.79 -  Wrist-Dors int II (pedis) 10   5.4  33.1    Right Median/Ulnar (Lumb-Dors Int II) Motor        Median (Lumb I)   Wrist 5.1 - 0.28 -  Wrist-Lumb I 10   5.3  33.3         Ulnar (Dors int II (pedis))   Wrist 2.8 - 1.33 -  Wrist-Dors int II (pedis) 10   4.8  33.3    Left Ulnar (ADM) Motor   Wrist 2.9  < 3.5 10.4  > 5.0  Wrist-ADM 8   4.9  33.2    Below Elbow 5.5 - 10.0 - -4 Below Elbow-Wrist 17.2 66  > 48 5.0 -2 33.1    Above Elbow 7.0 - 9.8 - -2 Above Elbow-Below Elbow 10.3 69  > 48 5.1 0 33.1    Right Ulnar (ADM) Motor   Wrist 2.7  < 3.5 10.2  > 5.0  Wrist-ADM 8   4.9  32.2    Below Elbow 5.5 - 9.2 - -10 Below Elbow-Wrist 18.1 65  > 48 4.9 -6 32.3    Above Elbow 7.0 - 9.2 - 0 Above Elbow-Below Elbow 10.2 68  > 48 4.9 1 32      Sensory Sites      Onset Lat Peak Lat Amp (O-P) Amp (P-P) Segment Distance Velocity Temperature Comment   Site ms (ms)  V Norm ( V)  cm m/s Norm ( C)    Left Median Sensory   Wrist-Dig II 4.2 5.2 8  > 10 7 Wrist-Dig II 14 33  > 48 33    Right Median  Sensory   Wrist-Dig II 4.1 5.5 13  > 10 21 Wrist-Dig II 14 34  > 48 32.7    Left Median-Ulnar Palmar Sensory        Median   Palm-Wrist 3.1 3.6 4 - 10 Palm-Wrist 8 26 - 33.1         Ulnar   Palm-Wrist 1.15 1.60 23 - 28 Palm-Wrist 8 70 - 33    Right Median-Ulnar Palmar Sensory        Median   Palm-Wrist 3.1 3.9 9 - 5 Palm-Wrist 8 26 - 33.6         Ulnar   Palm-Wrist 1.08 1.50 14 - 31 Palm-Wrist 8 74 - 33.6    Left Ulnar Sensory   Wrist-Dig V 2.3 2.9 38  > 8 53 Wrist-Dig V 12.5 54  > 48 33.1    Right Ulnar Sensory   Wrist-Dig V 1.90 2.7 25  > 8 35 Wrist-Dig V 12.5 66  > 48 33      Inter-Nerve Comparisons     Nerve 1 Value 1 Nerve 2 Value 2 Parameter Result Normal   Sensory Sites   R Median Palm-Wrist 3.9 ms R Ulnar Palm-Wrist 1.50 ms Peak Lat Diff 2.4 ms <0.40   L Median Palm-Wrist 3.6 ms L Ulnar Palm-Wrist 1.60 ms Peak Lat Diff 2.0 ms <0.40       Electromyography     Side Muscle Ins Act Fibs/PSW Fasc HF Amp Dur Poly Recrt Int Pat   Left Deltoid Nml None Nml 0 Nml Nml 0 Nml Nml   Left Biceps Nml None Nml 0 Nml Nml 0 Nml Nml   Left Triceps Nml None Nml 0 Nml Nml 0 Nml Nml   Left Pronator Teres Nml None Nml 0 Nml Nml 0 Nml Nml   Left FDI Nml None Nml 0 Nml Nml 0 Nml Nml   Right Deltoid Nml None Nml 0 Nml Nml 0 Nml Nml   Right Triceps Nml None Nml 0 Nml Nml 0 Nml Nml   Right FDI Nml None Nml 0 Nml Nml 0 Nml Nml         Waveforms / Images:    Motor                    Sensory

## 2025-05-14 NOTE — LETTER
2025       RE: Aleshia Land  [address]  [place] [address]     Dear Colleague,    Thank you for referring your patient, Aleshia Land, to the [facility] EMG CLINIC [place] [facility]. Please see a copy of my visit note below.                        [facility]  Electrodiagnostic Laboratory                 Department of Neurology                                                                                                         Test Date:  2025    Patient: Aleshia Land : 1975 Physician: Johny Tubbs MD   Sex: Female AGE: 50 year Ref Phys: Rashi Jah, DO   ID#: [id number]   Technician: Shruthi Giron     History and Examination:  50 year old with several months of intermittent numbness and paresthesias in the fingers. The first 3 fingers are most affected. The left side is more affected than the right. She also reports neck pain. This study is being performed to investigate for radiculopathy vs focal neuropathy.     Techniques:  Motor and sensory conduction studies were done with surface recording electrodes.  EMG was done with a concentric needle electrode.     Results  Nerve conduction studies:  1. Left median-D2 sensory response shows severely slowed CV and mildly reduced amplitude.   2. Right median-D2 sensory response shows severely slowed CV and normal amplitude.   3. Bilateral ulnar-D5 sensory responses are normal.   4. Bilateral median-ulnar palmar interlatency differences are prolonged.   5. Bilateral median-APB motor responses show prolonged DL, normal amplitude and normal CV in the forearm.   6. Bilateral ulnar-ADM motor responses are normal.   7. Bilateral median-lumbrical vs ulnar-interosseous latency differences are prolonged.     Needle EMG of selected proximal and distal right and left upper limb muscles was performed as tabulated below. No abnormal spontaneous activity was observed in the  sampled muscles. Motor unit potential morphology and recruitment patterns were normal.     Interpretation:  This is an abnormal study. 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 There is no evidence of a cervical radiculopathy affecting the upper limbs on the basis of this study. Clinical correlation is recommended.       Johny Tubbs MD  Department of Neurology        Nerve Conduction Studies  Motor Sites      Latency Neg. Amp Neg. Amp Diff Segment Distance Velocity Neg. Dur Neg Area Diff Temperature Comment   Site (ms) Norm (mV) Norm (%)  cm m/s Norm (ms) (%) ( C)    Left Median (APB) Motor        Median (APB)   Wrist 6.6  < 4.4 6.1  > 5.0  Wrist-APB 7.8   6.0  33.2    Elbow 9.7 - 6.5  > 5.0 7 Elbow-Wrist 17 55  > 48 6.2 6 33.1         Ulnar (APB)   Wrist+1 3.0 - 6.4 -      5.5  33.4    Right Median (APB) Motor   Wrist 6.4  < 4.4 6.9  > 5.0  Wrist-APB 8   5.5  31    Elbow 6.9 - 6.4  > 5.0 -7 Elbow-Wrist 18 69  > 48 8.4 6 31    Left Median/Ulnar (Lumb-Dors Int II) Motor        Median (Lumb I)   Wrist 6.8 - 0.17 -  Wrist-Lumb I 10   7.5  33         Ulnar (Dors int II (pedis))   Wrist 3.1 - 0.79 -  Wrist-Dors int II (pedis) 10   5.4  33.1    Right Median/Ulnar (Lumb-Dors Int II) Motor        Median (Lumb I)   Wrist 5.1 - 0.28 -  Wrist-Lumb I 10   5.3  33.3         Ulnar (Dors int II (pedis))   Wrist 2.8 - 1.33 -  Wrist-Dors int II (pedis) 10   4.8  33.3    Left Ulnar (ADM) Motor   Wrist 2.9  < 3.5 10.4  > 5.0  Wrist-ADM 8   4.9  33.2    Below Elbow 5.5 - 10.0 - -4 Below Elbow-Wrist 17.2 66  > 48 5.0 -2 33.1    Above Elbow 7.0 - 9.8 - -2 Above Elbow-Below Elbow 10.3 69  > 48 5.1 0 33.1    Right Ulnar (ADM) Motor   Wrist 2.7  < 3.5 10.2  > 5.0  Wrist-ADM 8   4.9  32.2    Below Elbow 5.5 - 9.2 - -10 Below Elbow-Wrist 18.1 65  > 48 4.9 -6 32.3    Above Elbow 7.0 - 9.2  - 0 Above Elbow-Below Elbow 10.2 68  > 48 4.9 1 32      Sensory Sites      Onset Lat Peak Lat Amp (O-P) Amp (P-P) Segment Distance Velocity Temperature Comment   Site ms (ms)  V Norm ( V)  cm m/s Norm ( C)    Left Median Sensory   Wrist-Dig II 4.2 5.2 8  > 10 7 Wrist-Dig II 14 33  > 48 33    Right Median Sensory   Wrist-Dig II 4.1 5.5 13  > 10 21 Wrist-Dig II 14 34  > 48 32.7    Left Median-Ulnar Palmar Sensory        Median   Palm-Wrist 3.1 3.6 4 - 10 Palm-Wrist 8 26 - 33.1         Ulnar   Palm-Wrist 1.15 1.60 23 - 28 Palm-Wrist 8 70 - 33    Right Median-Ulnar Palmar Sensory        Median   Palm-Wrist 3.1 3.9 9 - 5 Palm-Wrist 8 26 - 33.6         Ulnar   Palm-Wrist 1.08 1.50 14 - 31 Palm-Wrist 8 74 - 33.6    Left Ulnar Sensory   Wrist-Dig V 2.3 2.9 38  > 8 53 Wrist-Dig V 12.5 54  > 48 33.1    Right Ulnar Sensory   Wrist-Dig V 1.90 2.7 25  > 8 35 Wrist-Dig V 12.5 66  > 48 33      Inter-Nerve Comparisons     Nerve 1 Value 1 Nerve 2 Value 2 Parameter Result Normal   Sensory Sites   R Median Palm-Wrist 3.9 ms R Ulnar Palm-Wrist 1.50 ms Peak Lat Diff 2.4 ms <0.40   L Median Palm-Wrist 3.6 ms L Ulnar Palm-Wrist 1.60 ms Peak Lat Diff 2.0 ms <0.40       Electromyography     Side Muscle Ins Act Fibs/PSW Fasc HF Amp Dur Poly Recrt Int Pat   Left Deltoid Nml None Nml 0 Nml Nml 0 Nml Nml   Left Biceps Nml None Nml 0 Nml Nml 0 Nml Nml   Left Triceps Nml None Nml 0 Nml Nml 0 Nml Nml   Left Pronator Teres Nml None Nml 0 Nml Nml 0 Nml Nml   Left FDI Nml None Nml 0 Nml Nml 0 Nml Nml   Right Deltoid Nml None Nml 0 Nml Nml 0 Nml Nml   Right Triceps Nml None Nml 0 Nml Nml 0 Nml Nml   Right FDI Nml None Nml 0 Nml Nml 0 Nml Nml         Waveforms / Images:    Motor                    Sensory                            Again, thank you for allowing me to participate in the care of your patient.      Sincerely,    Johny Tubbs MD

## 2025-05-26 ENCOUNTER — PATIENT OUTREACH (OUTPATIENT)
Dept: CARE COORDINATION | Facility: CLINIC | Age: 50
End: 2025-05-26
Payer: COMMERCIAL

## 2025-05-30 ENCOUNTER — TELEPHONE (OUTPATIENT)
Dept: ORTHOPEDICS | Facility: CLINIC | Age: 50
End: 2025-05-30

## 2025-05-30 NOTE — TELEPHONE ENCOUNTER
Procedure: Left open carpal tunnel release  Facility: [facility]  Length: 30 minutes  Anesthesia: Local, MAC  Post-op appointments needed: 2 weeks with surgeon or PA, 6 weeks with surgeon only.  Surgery packet/instructions given to patient?  Yes     Melchor Padron RNCC

## 2025-06-03 ENCOUNTER — ANCILLARY PROCEDURE (OUTPATIENT)
Dept: MAMMOGRAPHY | Facility: CLINIC | Age: 50
End: 2025-06-03
Attending: NURSE PRACTITIONER
Payer: COMMERCIAL

## 2025-06-03 DIAGNOSIS — Z12.31 SCREENING MAMMOGRAM FOR BREAST CANCER: ICD-10-CM

## 2025-06-03 PROBLEM — G56.03 BILATERAL CARPAL TUNNEL SYNDROME: Status: ACTIVE | Noted: 2025-05-30

## 2025-06-03 PROCEDURE — 77067 SCR MAMMO BI INCL CAD: CPT | Performed by: STUDENT IN AN ORGANIZED HEALTH CARE EDUCATION/TRAINING PROGRAM

## 2025-06-03 PROCEDURE — 77063 BREAST TOMOSYNTHESIS BI: CPT | Performed by: STUDENT IN AN ORGANIZED HEALTH CARE EDUCATION/TRAINING PROGRAM

## 2025-06-03 NOTE — TELEPHONE ENCOUNTER
Incoming patient call to schedule surgery with Dr. Masterson.    Spoke with: Patient     Reason for Surgical Date: Next available.    Date of Surgery: 6/18/2025    Estimated Arrival time Discussed with Patient:  No    Location of surgery: [facility]     Pre-Op H&P: Scheduled with PAC on 6/9/2025 at 5:45 PM - Video.    Imaging: No     Additional Appointments: No     Post-Op Appt Date w/ NP/PA:  Elisa Altamirano PA-C  6/30/2025 at 8:00 AM     Discussed with patient PAC RN will provide arrival time and instructions for surgery at the time of the appointment: [[place] locations only]: Yes    Standard Surgery Packet Sent: Yes 05/30/25  via Received in clinic by RN/ATC.       Additional Comments: N/A.    Bobby Corea on 6/3/2025 at 4:27 PM

## 2025-06-03 NOTE — TELEPHONE ENCOUNTER
Left voice message for patient to return phone call to schedule surgery with Dr. Masterson.     Provided surgery scheduling phone number: [phone].    Bobby Corea on 6/3/2025 at 9:22 AM

## 2025-06-04 NOTE — TELEPHONE ENCOUNTER
"Called to discuss pre-op packet.  Pre-Operative Teaching Flowsheet     Visit Type: Telephone    Location of Surgery (known or anticipated): [facility]   Type of Anesthesia: Local or MAC    Pertinent Medical History: No Pertinent Medical History  BMI: 40    Person(s) involved in teaching: Patient    Caregiver//  Name: Spouse      Motivation Level:  Receptive (willing/able to accept information) and asks appropriate questions where applicable: Yes  Any cultural factors/Protestant beliefs that may influence understanding or compliance? No     Patient demonstrates understanding of the following:  Pre-operative planning, including the necessary appointments and preparation needed prior to surgery: Yes  Which situations necessitate calling provider and whom to contact: Yes  Pain management techniques pre and post op: Yes  Stoplight tool introduced, questions answered, patient expressed understanding: Yes  How, and when, to access community resources: Yes  Discussed appropriate and safe discharge to home: Yes  Patient has a designated  for surgery and \"\" to stay with them after: Yes    Additional Teaching Concerns Addressed:  Post-operative living arrangements and necessary adaptations to living environment.  Instructional Materials Used/Given: Yes, pre-op packet given to patient with additional system forms added as needed depending on type of surgery. Pre-op soap given (if in clinic).    Total Time Spent: 20 min.    TOÑITO Esquivel       "

## 2025-06-05 ENCOUNTER — RESULTS FOLLOW-UP (OUTPATIENT)
Dept: FAMILY MEDICINE | Facility: CLINIC | Age: 50
End: 2025-06-05

## 2025-06-05 NOTE — CONFIDENTIAL NOTE
FUTURE VISIT INFORMATION      SURGERY INFORMATION:  Date: 6.18.25   Location: [facility]  Surgeon:  Kyle Masterson MD   Anesthesia Type:  MAC with Local   Procedure: Left open carpal tunnel release     RECORDS REQUESTED FROM:       Pertinent Medical History:  Bilateral carpal tunnel syndrome

## 2025-06-09 ENCOUNTER — ANESTHESIA EVENT (OUTPATIENT)
Dept: SURGERY | Facility: AMBULATORY SURGERY CENTER | Age: 50
End: 2025-06-09
Payer: COMMERCIAL

## 2025-06-09 ENCOUNTER — VIRTUAL VISIT (OUTPATIENT)
Dept: SURGERY | Facility: CLINIC | Age: 50
End: 2025-06-09
Payer: COMMERCIAL

## 2025-06-09 ENCOUNTER — PRE VISIT (OUTPATIENT)
Dept: SURGERY | Facility: CLINIC | Age: 50
End: 2025-06-09

## 2025-06-09 VITALS — WEIGHT: 190 LBS | HEIGHT: 58 IN | BODY MASS INDEX: 39.88 KG/M2

## 2025-06-09 DIAGNOSIS — Z01.818 PRE-OP EVALUATION: Primary | ICD-10-CM

## 2025-06-09 PROCEDURE — 98000 SYNCH AUDIO-VIDEO NEW SF 15: CPT | Performed by: PHYSICIAN ASSISTANT

## 2025-06-09 ASSESSMENT — ENCOUNTER SYMPTOMS: SEIZURES: 0

## 2025-06-09 ASSESSMENT — PAIN SCALES - GENERAL: PAINLEVEL_OUTOF10: NO PAIN (0)

## 2025-06-09 ASSESSMENT — LIFESTYLE VARIABLES: TOBACCO_USE: 0

## 2025-06-09 NOTE — H&P
Pre-Operative H & P     CC:  Preoperative exam to assess for increased cardiopulmonary risk while undergoing surgery and anesthesia.    Date of Encounter: 6/9/2025  Primary Care Physician:  No Ref-Primary, Physician     Reason for visit:   Encounter Diagnosis   Name Primary?    Pre-op evaluation Yes       HPI  Aleshia Land is a 50 year old female who presents for pre-operative H & P in preparation for  Procedure Information       Case: [id number] Date/Time: 06/18/25 1340    Procedure: Left open carpal tunnel release (Left: Wrist)    Anesthesia type: MAC with Local    Diagnosis: Bilateral carpal tunnel syndrome [G56.03]    Pre-op diagnosis: Bilateral carpal tunnel syndrome [G56.03]    Location: [place] [address] / [facility]    Providers: Kyle Masterson MD            Patient is being evaluated for comorbid conditions of asthma, hypertension    Ms. Land was recently evaluated by MARS Masterson for bilateral fingertip tingling and numbness. She was found to have bilateral carpal tunnel. The above procedure is planned.     History is obtained from the patient and chart review    Hx of abnormal bleeding or anti-platelet use: denies    Menstrual history: No LMP recorded (lmp unknown). Patient is perimenopausal.    Past Medical History  Past Medical History:   Diagnosis Date    Asthma     HTN (hypertension)        Past Surgical History  Past Surgical History:   Procedure Laterality Date    APPENDECTOMY         Prior to Admission Medications  Current Outpatient Medications   Medication Sig Dispense Refill    albuterol (PROAIR HFA/PROVENTIL HFA/VENTOLIN HFA) 108 (90 BASE) MCG/ACT Inhaler Inhale 2 puffs into the lungs every 4 hours as needed 1 Inhaler 3    losartan (COZAAR) 25 MG tablet Take 1 tablet (25 mg) by mouth daily. (Patient not taking: Reported on 6/9/2025) 60 tablet 0       Allergies  No Known Allergies    Social History  Social History     Socioeconomic History     Marital status:      Spouse name: Not on file    Number of children: Not on file    Years of education: Not on file    Highest education level: Not on file   Occupational History    Not on file   Tobacco Use    Smoking status: Never     Passive exposure: Never    Smokeless tobacco: Not on file   Vaping Use    Vaping status: Never Used   Substance and Sexual Activity    Alcohol use: Yes     Comment: Occasional    Drug use: No    Sexual activity: Not on file   Other Topics Concern    Parent/sibling w/ CABG, MI or angioplasty before 65F 55M? Not Asked   Social History Narrative    Not on file     Social Drivers of Health     Financial Resource Strain: Low Risk  (4/22/2025)    Financial Resource Strain     Within the past 12 months, have you or your family members you live with been unable to get utilities (heat, electricity) when it was really needed?: No   Food Insecurity: Low Risk  (4/22/2025)    Food Insecurity     Within the past 12 months, did you worry that your food would run out before you got money to buy more?: No     Within the past 12 months, did the food you bought just not last and you didn t have money to get more?: No   Transportation Needs: Low Risk  (4/22/2025)    Transportation Needs     Within the past 12 months, has lack of transportation kept you from medical appointments, getting your medicines, non-medical meetings or appointments, work, or from getting things that you need?: No   Physical Activity: Inactive (5/23/2025)    Exercise Vital Sign     Days of Exercise per Week: 0 days     Minutes of Exercise per Session: 0 min   Stress: No Stress Concern Present (4/22/2025)    [employer] [place] [employer] - Occupational Stress Questionnaire     Feeling of Stress : Only a little   Social Connections: Unknown (4/22/2025)    Social Connection and Isolation Panel [NHANES]     Frequency of Communication with Friends and Family: Not on file     Frequency of Social Gatherings with Friends  and Family: Once a week     Attends [facility] Services: Not on file     Active Member of Clubs or Organizations: Not on file     Attends Club or Organization Meetings: Not on file     Marital Status: Not on file   Interpersonal Safety: Low Risk  (4/22/2025)    Interpersonal Safety     Do you feel physically and emotionally safe where you currently live?: Yes     Within the past 12 months, have you been hit, slapped, kicked or otherwise physically hurt by someone?: No     Within the past 12 months, have you been humiliated or emotionally abused in other ways by your partner or ex-partner?: No   Housing Stability: High Risk (4/22/2025)    Housing Stability     Do you have housing? : No     Are you worried about losing your housing?: No       Family History  Family History   Problem Relation Age of Onset    Diabetes Mother     Anesthesia Reaction No family hx of     Deep Vein Thrombosis (DVT) No family hx of        Review of Systems  The complete review of systems is negative other than noted in the HPI or here.   Anesthesia Evaluation   Pt has had prior anesthetic.     No history of anesthetic complications       ROS/MED HX  ENT/Pulmonary:     (+)                     Intermittent, asthma  Treatment: Inhaler prn,              (-) tobacco use   Neurologic: Comment: Carpal tunnel   (-) no seizures and no CVA   Cardiovascular:     (+)  hypertension- -   -  - -                                 Previous cardiac testing   Echo: Date: Results:    Stress Test:  Date: Results:    ECG Reviewed:  Date: 2017 Results:  NSR  Cath:  Date: Results:   (-) taking anticoagulants/antiplatelets   METS/Exercise Tolerance:  Comment: Walking 15-20 minutes multiple times per week. Works on feet. Can ascend stairs. Denies PALACIO or CP    Hematologic:  - neg hematologic  ROS  (-) history of blood clots and history of blood transfusion   Musculoskeletal:       GI/Hepatic:  - neg GI/hepatic ROS  (-) GERD   Renal/Genitourinary:  - neg Renal ROS      Endo:  - neg endo ROS  (-) chronic steroid usage   Psychiatric/Substance Use:       Infectious Disease:  - neg infectious disease ROS     Malignancy:       Other:            Virtual visit -  No vitals were obtained    Physical Exam  Constitutional: Awake, alert, cooperative, no apparent distress, and appears stated age.  HENT: Normocephalic  Respiratory: non labored breathing   Neurologic: Awake, alert, oriented to name, place and time.   Neuropsychiatric: Calm, cooperative. Normal affect.      Prior Labs/Diagnostic Studies   All labs and imaging pertinent to the visit personally reviewed     EKG/ stress test - if available please see in ROS above   No results found.       No data to display                  The patient's records and results pertinent to the visit personally reviewed by this provider.     Outside records reviewed from: Care Everywhere      Assessment    Aleshia Land is a 50 year old female seen as a PAC referral for risk assessment and optimization for anesthesia.    Plan/Recommendations  Pt will be optimized for the proposed procedure.  See below for details on the assessment, risk, and preoperative recommendations    NEUROLOGY  - No history of TIA, CVA or seizure  -Post Op delirium risk factors:  No risk identified    ENT  - No current airway concerns.  Will need to be reassessed day of surgery.  Mallampati: Unable to assess  TM: Unable to assess    CARDIAC  - No history of CAD and Afib  -hypertension using cozaar . She reports she was not taking this routinely, but will restart taking as prescribed.   - METS (Metabolic Equivalents)  Patient performs 4 or more METS exercise without symptoms             Total Score: 0      RCRI-Very low risk: Class 1 0.4% complication rate             Total Score: 0        PULMONARY  ADITI Low Risk             Total Score: 2    ADITI: Hypertension    ADITI: BMI over 35 kg/m2      -asthma using albuterol PRN. Controlled   - Tobacco History    History   Smoking Status  "   Never   Smokeless Tobacco    Not on file       GI  PONV High Risk  Total Score: 3           1 AN PONV: Pt is Female    1 AN PONV: Patient is not a current smoker    1 AN PONV: Intended Post Op Opioids        /RENAL  - Baseline Creatinine WNL    ENDOCRINE    - BMI: Estimated body mass index is 39.71 kg/m  as calculated from the following:    Height as of this encounter: 1.473 m (4' 10\").    Weight as of this encounter: 86.2 kg (190 lb).  Class 3 Obesity (BMI > 40)    HEME  VTE Low Risk 0.26%             Total Score: 0      - No history of abnormal bleeding or antiplatelet use.    MSK  -carpal tunnel with the above procedure planned     Different anesthesia methods/types have been discussed with the patient, but they are aware that the final plan will be decided by the assigned anesthesia provider on the date of service.    The patient is optimized for their procedure. AVS with information on surgery time/arrival time, meds and NPO status given by nursing staff. No further diagnostic testing indicated.    Please refer to the physical examination documented by the anesthesiologist in the anesthesia record on the day of surgery.    Video-Visit Details    Type of service:  Video Visit    Provider received verbal consent for a Video Visit from the patient? Yes     Originating Location (pt. Location): Home    Distant Location (provider location):  Off-site  Mode of Communication:  Video Conference via [employer]    15 minutes were spent on the date of the encounter performing chart review, history and exam, documentation and/or discussion with other providers about the issues documented above.    Alice Osorio PA-C  Preoperative Assessment Center  [facility]  Clinic and Surgery Center  Phone: [phone]  Fax: [phone]    "

## 2025-06-09 NOTE — PATIENT INSTRUCTIONS
Preparing for Your Surgery      Name:  Aleshia Land   MRN:  [id number]   :  1975   Today's Date:  2025       [employer] [place] [employer] [address] Construction Project                                Timeline 2025 -2025    This project will affect travel to the [facility], as well as the [facility].      Please check the [facility] [address] project website for the most up to date information and give yourself additional time to reach your destination.              Arriving for surgery:  Surgery date:  25  Arrival time:  12:10 pm  Surgery time: 1:40 pm    Restrictions due to COVID 19:    Please maintain social distance.  Masking is optional        parking is available for anyone with mobility limitations or disabilities. (Monday- Friday 7 am- 5 pm)    Please come to:    [facility]  [address] [place] [id number]    Check in on the 5th floor, Ambulatory Surgery Center.    What can I eat or drink?    -  You may eat and drink normally until 8 hours before arrival time  (Until 4:10 am)  -  You may have clear liquids until 2 hours before arrival time  (Until 10:10 am)    Examples of clear liquids:  Water  Clear broth  Juices (apple, white grape, white cranberry  and cider) without pulp  Noncarbonated, powder based beverages  (lemonade and Luis Felipe-Aid)  Sodas (Sprite, 7-Up, ginger ale and seltzer)  Coffee or tea (without milk or cream)  Gatorade    --No alcohol or cannabis products for at least 24 hours before surgery    Which medicines can I take?    Hold Aspirin for 7 days before surgery.   Hold Multivitamins for 7 days before surgery.  Hold Supplements for 7 days before surgery.  Hold Ibuprofen (Advil, Motrin) for 1 day before surgery--unless otherwise directed by surgeon.  Hold Naproxen (Aleve) for 4 days before surgery.      -  PLEASE TAKE the following medications the day of surgery or per  your usual routine:  Albuterol inhaler if needed  Losartan       How do I prepare myself?  - Please take 2 showers (one the night prior to surgery and one the morning of surgery) using Scrubcare or Hibiclens soap.    Use this soap only from the neck to your toes. Avoid genital area      Leave the soap on your skin for one minute--then rinse thoroughly.      You may use your own shampoo and conditioner; no other hair products.   - Please remove all jewelry and body piercings.  - No lotions, deodorants or fragrance.  - No makeup or fingernail polish.   - Bring your ID and insurance card.    -If you have a Deep Brain Stimulator, a Spinal Cord Stimulator or any implanted Neuro device you must bring the remote to the Surgery Center          ALL PATIENTS ARE REQUIRED TO HAVE A RESPONSIBLE ADULT TO DRIVE AND BE IN ATTENDANCE WITH THEM FOR 24 HOURS FOLLOWING SURGERY       Covid testing policy as of 12/06/2022  Your surgeon will notify and schedule you for a COVID test if one is needed before surgery--please direct any questions or COVID symptoms to your surgeon      Questions or Concerns:    -For questions regarding the day of surgery please contact the Ambulatory Surgery Center at [phone].    -If you have health changes between today and your surgery please contact your surgeon.     For questions after surgery please call your surgeons office.

## 2025-06-09 NOTE — PROGRESS NOTES
Aleshia is a 50 year old who is being evaluated via a billable video visit.    How would you like to obtain your AVS? [employer]  If the video visit is dropped, the invitation should be resent by: Text to cell phone: [phone]

## 2025-06-11 ENCOUNTER — ANCILLARY PROCEDURE (OUTPATIENT)
Dept: MAMMOGRAPHY | Facility: CLINIC | Age: 50
End: 2025-06-11
Attending: NURSE PRACTITIONER
Payer: COMMERCIAL

## 2025-06-11 DIAGNOSIS — R92.8 ABNORMAL MAMMOGRAM OF LEFT BREAST: ICD-10-CM

## 2025-06-11 PROCEDURE — 77065 DX MAMMO INCL CAD UNI: CPT | Mod: LT | Performed by: STUDENT IN AN ORGANIZED HEALTH CARE EDUCATION/TRAINING PROGRAM

## 2025-06-11 PROCEDURE — G0279 TOMOSYNTHESIS, MAMMO: HCPCS | Performed by: STUDENT IN AN ORGANIZED HEALTH CARE EDUCATION/TRAINING PROGRAM

## 2025-06-12 ENCOUNTER — RESULTS FOLLOW-UP (OUTPATIENT)
Dept: FAMILY MEDICINE | Facility: CLINIC | Age: 50
End: 2025-06-12

## 2025-06-18 ENCOUNTER — HOSPITAL ENCOUNTER (OUTPATIENT)
Facility: AMBULATORY SURGERY CENTER | Age: 50
Discharge: HOME | End: 2025-06-18
Attending: STUDENT IN AN ORGANIZED HEALTH CARE EDUCATION/TRAINING PROGRAM
Payer: COMMERCIAL

## 2025-06-18 ENCOUNTER — ANESTHESIA (OUTPATIENT)
Dept: SURGERY | Facility: AMBULATORY SURGERY CENTER | Age: 50
End: 2025-06-18
Payer: COMMERCIAL

## 2025-06-18 VITALS
HEIGHT: 58 IN | DIASTOLIC BLOOD PRESSURE: 79 MMHG | WEIGHT: 190 LBS | SYSTOLIC BLOOD PRESSURE: 132 MMHG | OXYGEN SATURATION: 95 % | RESPIRATION RATE: 16 BRPM | HEART RATE: 66 BPM | TEMPERATURE: 97.4 F | BODY MASS INDEX: 39.88 KG/M2

## 2025-06-18 PROCEDURE — 64721 CARPAL TUNNEL SURGERY: CPT | Mod: LT | Performed by: STUDENT IN AN ORGANIZED HEALTH CARE EDUCATION/TRAINING PROGRAM

## 2025-06-18 PROCEDURE — 64721 CARPAL TUNNEL SURGERY: CPT | Mod: LT

## 2025-06-18 RX ORDER — PROPOFOL 10 MG/ML
INJECTION, EMULSION INTRAVENOUS PRN
Status: DISCONTINUED | OUTPATIENT
Start: 2025-06-18 | End: 2025-06-18

## 2025-06-18 RX ORDER — PROPOFOL 10 MG/ML
INJECTION, EMULSION INTRAVENOUS CONTINUOUS PRN
Status: DISCONTINUED | OUTPATIENT
Start: 2025-06-18 | End: 2025-06-18

## 2025-06-18 RX ORDER — ONDANSETRON 4 MG/1
4 TABLET, ORALLY DISINTEGRATING ORAL EVERY 30 MIN PRN
Status: DISCONTINUED | OUTPATIENT
Start: 2025-06-18 | End: 2025-06-19 | Stop reason: HOSPADM

## 2025-06-18 RX ORDER — NALOXONE HYDROCHLORIDE 0.4 MG/ML
0.1 INJECTION, SOLUTION INTRAMUSCULAR; INTRAVENOUS; SUBCUTANEOUS
Status: DISCONTINUED | OUTPATIENT
Start: 2025-06-18 | End: 2025-06-19 | Stop reason: HOSPADM

## 2025-06-18 RX ORDER — DEXAMETHASONE SODIUM PHOSPHATE 10 MG/ML
4 INJECTION, SOLUTION INTRAMUSCULAR; INTRAVENOUS
Status: DISCONTINUED | OUTPATIENT
Start: 2025-06-18 | End: 2025-06-19 | Stop reason: HOSPADM

## 2025-06-18 RX ORDER — SODIUM CHLORIDE, SODIUM LACTATE, POTASSIUM CHLORIDE, CALCIUM CHLORIDE 600; 310; 30; 20 MG/100ML; MG/100ML; MG/100ML; MG/100ML
INJECTION, SOLUTION INTRAVENOUS CONTINUOUS PRN
Status: DISCONTINUED | OUTPATIENT
Start: 2025-06-18 | End: 2025-06-18

## 2025-06-18 RX ORDER — HYDRALAZINE HYDROCHLORIDE 20 MG/ML
2.5-5 INJECTION INTRAMUSCULAR; INTRAVENOUS EVERY 10 MIN PRN
Status: DISCONTINUED | OUTPATIENT
Start: 2025-06-18 | End: 2025-06-19 | Stop reason: HOSPADM

## 2025-06-18 RX ORDER — ONDANSETRON 2 MG/ML
4 INJECTION INTRAMUSCULAR; INTRAVENOUS EVERY 30 MIN PRN
Status: DISCONTINUED | OUTPATIENT
Start: 2025-06-18 | End: 2025-06-19 | Stop reason: HOSPADM

## 2025-06-18 RX ORDER — LABETALOL HYDROCHLORIDE 5 MG/ML
10 INJECTION, SOLUTION INTRAVENOUS
Status: DISCONTINUED | OUTPATIENT
Start: 2025-06-18 | End: 2025-06-19 | Stop reason: HOSPADM

## 2025-06-18 RX ORDER — OXYCODONE HYDROCHLORIDE 5 MG/1
5 TABLET ORAL
Status: DISCONTINUED | OUTPATIENT
Start: 2025-06-18 | End: 2025-06-19 | Stop reason: HOSPADM

## 2025-06-18 RX ORDER — HYDROMORPHONE HYDROCHLORIDE 1 MG/ML
0.4 INJECTION, SOLUTION INTRAMUSCULAR; INTRAVENOUS; SUBCUTANEOUS EVERY 5 MIN PRN
Status: DISCONTINUED | OUTPATIENT
Start: 2025-06-18 | End: 2025-06-19 | Stop reason: HOSPADM

## 2025-06-18 RX ORDER — SODIUM CHLORIDE, SODIUM LACTATE, POTASSIUM CHLORIDE, CALCIUM CHLORIDE 600; 310; 30; 20 MG/100ML; MG/100ML; MG/100ML; MG/100ML
INJECTION, SOLUTION INTRAVENOUS CONTINUOUS
Status: DISCONTINUED | OUTPATIENT
Start: 2025-06-18 | End: 2025-06-19 | Stop reason: HOSPADM

## 2025-06-18 RX ORDER — ONDANSETRON 2 MG/ML
INJECTION INTRAMUSCULAR; INTRAVENOUS PRN
Status: DISCONTINUED | OUTPATIENT
Start: 2025-06-18 | End: 2025-06-18

## 2025-06-18 RX ORDER — FENTANYL CITRATE 50 UG/ML
50 INJECTION, SOLUTION INTRAMUSCULAR; INTRAVENOUS EVERY 5 MIN PRN
Status: DISCONTINUED | OUTPATIENT
Start: 2025-06-18 | End: 2025-06-19 | Stop reason: HOSPADM

## 2025-06-18 RX ORDER — DEXAMETHASONE SODIUM PHOSPHATE 4 MG/ML
INJECTION, SOLUTION INTRA-ARTICULAR; INTRALESIONAL; INTRAMUSCULAR; INTRAVENOUS; SOFT TISSUE PRN
Status: DISCONTINUED | OUTPATIENT
Start: 2025-06-18 | End: 2025-06-18

## 2025-06-18 RX ORDER — ACETAMINOPHEN 325 MG/1
975 TABLET ORAL ONCE
Status: COMPLETED | OUTPATIENT
Start: 2025-06-18 | End: 2025-06-18

## 2025-06-18 RX ORDER — OXYCODONE HYDROCHLORIDE 5 MG/1
10 TABLET ORAL
Status: DISCONTINUED | OUTPATIENT
Start: 2025-06-18 | End: 2025-06-19 | Stop reason: HOSPADM

## 2025-06-18 RX ORDER — LIDOCAINE HYDROCHLORIDE 20 MG/ML
INJECTION, SOLUTION INFILTRATION; PERINEURAL PRN
Status: DISCONTINUED | OUTPATIENT
Start: 2025-06-18 | End: 2025-06-18

## 2025-06-18 RX ORDER — GINSENG 100 MG
CAPSULE ORAL PRN
Status: DISCONTINUED | OUTPATIENT
Start: 2025-06-18 | End: 2025-06-18 | Stop reason: HOSPADM

## 2025-06-18 RX ORDER — LIDOCAINE 40 MG/G
CREAM TOPICAL
Status: DISCONTINUED | OUTPATIENT
Start: 2025-06-18 | End: 2025-06-19 | Stop reason: HOSPADM

## 2025-06-18 RX ORDER — FENTANYL CITRATE 50 UG/ML
25 INJECTION, SOLUTION INTRAMUSCULAR; INTRAVENOUS EVERY 5 MIN PRN
Status: DISCONTINUED | OUTPATIENT
Start: 2025-06-18 | End: 2025-06-19 | Stop reason: HOSPADM

## 2025-06-18 RX ORDER — HYDROMORPHONE HYDROCHLORIDE 1 MG/ML
0.2 INJECTION, SOLUTION INTRAMUSCULAR; INTRAVENOUS; SUBCUTANEOUS EVERY 5 MIN PRN
Status: DISCONTINUED | OUTPATIENT
Start: 2025-06-18 | End: 2025-06-19 | Stop reason: HOSPADM

## 2025-06-18 RX ADMIN — ONDANSETRON 4 MG: 2 INJECTION INTRAMUSCULAR; INTRAVENOUS at 12:58

## 2025-06-18 RX ADMIN — PROPOFOL 30 MG: 10 INJECTION, EMULSION INTRAVENOUS at 12:58

## 2025-06-18 RX ADMIN — SODIUM CHLORIDE, SODIUM LACTATE, POTASSIUM CHLORIDE, CALCIUM CHLORIDE: 600; 310; 30; 20 INJECTION, SOLUTION INTRAVENOUS at 11:32

## 2025-06-18 RX ADMIN — DEXAMETHASONE SODIUM PHOSPHATE 4 MG: 4 INJECTION, SOLUTION INTRA-ARTICULAR; INTRALESIONAL; INTRAMUSCULAR; INTRAVENOUS; SOFT TISSUE at 12:58

## 2025-06-18 RX ADMIN — LIDOCAINE HYDROCHLORIDE 80 MG: 20 INJECTION, SOLUTION INFILTRATION; PERINEURAL at 12:58

## 2025-06-18 RX ADMIN — SODIUM CHLORIDE, SODIUM LACTATE, POTASSIUM CHLORIDE, CALCIUM CHLORIDE: 600; 310; 30; 20 INJECTION, SOLUTION INTRAVENOUS at 12:54

## 2025-06-18 RX ADMIN — PROPOFOL 30 MG: 10 INJECTION, EMULSION INTRAVENOUS at 13:02

## 2025-06-18 RX ADMIN — ACETAMINOPHEN 975 MG: 325 TABLET ORAL at 11:33

## 2025-06-18 RX ADMIN — PROPOFOL 150 MCG/KG/MIN: 10 INJECTION, EMULSION INTRAVENOUS at 12:58

## 2025-06-18 ASSESSMENT — LIFESTYLE VARIABLES: TOBACCO_USE: 0

## 2025-06-18 ASSESSMENT — ENCOUNTER SYMPTOMS: SEIZURES: 0

## 2025-06-18 NOTE — ANESTHESIA CARE TRANSFER NOTE
Patient: Aleshia Land    Procedure: Procedure(s):  Left open carpal tunnel release       Diagnosis: Bilateral carpal tunnel syndrome [G56.03]  Diagnosis Additional Information: No value filed.    Anesthesia Type:   MAC     Note:    Oropharynx: oropharynx clear of all foreign objects and spontaneously breathing  Level of Consciousness: awake and drowsy  Oxygen Supplementation: room air    Independent Airway: airway patency satisfactory and stable  Dentition: dentition unchanged  Vital Signs Stable: post-procedure vital signs reviewed and stable  Report to RN Given: handoff report given  Patient transferred to: Phase II    Handoff Report: Identifed the Patient, Identified the Reponsible Provider, Reviewed the pertinent medical history, Discussed the surgical course, Reviewed Intra-OP anesthesia mangement and issues during anesthesia, Set expectations for post-procedure period and Allowed opportunity for questions and acknowledgement of understanding      Vitals:  Vitals Value Taken Time   BP 99/59 06/18/25 13:39   Temp     Pulse 76 06/18/25 13:39   Resp     SpO2 94 % 06/18/25 13:41   Vitals shown include unfiled device data.    Electronically Signed By: MAURI Cabezas CRNA  June 18, 2025  1:41 PM

## 2025-06-18 NOTE — OP NOTE
HAND SURGERY OPERATIVE REPORT     Date of Surgery: 6/18/2025  Surgical Service: Ortho Hand     Preoperative Diagnosis: Left carpal tunnel syndrome     Postoperative Diagnosis: Left carpal tunnel syndrome     Procedures Performed: Left open carpal tunnel release     Attending:  LM Masterson MD, PhD, FACS  Assistant: PARI Wolfe MD     Complications: None apparent  Specimens: None  Implants: None  Estimated blood loss: < 5 mL  Tourniquet time: 6 minutes  Wound classification: Clean  Anesthesia: MAC with local (1% lidocaine with epinephrine mixed 1:1 with 0.25% bupivicaine plain)     Indications for Procedure: Patient is a 50 year-old right-handed non-smoker with long-standing history of now nerve conduction study-confirmed left carpal tunnel syndrome.  She has tried splinting and conservative management. We discussed the options for continued conservative management versus surgery. After discussing the risks of the surgery, including but not limited to: infection, bleeding, pain, scarring, incomplete release, need for revision, injury to nerves, neuroma formation, complex regional pain syndrome. After discussing risks, benefits and alternatives, patient elects to proceed with left open carpal tunnel release.     Intraoperative findings: The left transverse carpal ligament (TCL) was fully released with the median nerve visualized and protected throughout the case. The distal deep palmar fat pad was visualized on the distal release. The proximal extent of the carpal tunnel was decompressed including division of the distal antebrachial fascia under direct visualization. The TCL was thickened.      Description of Procedure: Patient was seen in the preoperative holding area.  Consent was verified.  The left palm was marked.  All additional questions were answered.  The risks of the surgery were reiterated, including (but not limited to): infection, bleeding, scarring, pain, injury to surrounding structures including  nerves and tendons, incomplete release, need for additional revision surgery, neuroma formation, complex regional pain syndrome, stiffness. Following discussion of all these risks, the patient again agreed to proceed with surgery.  Patient was then transferred to the operating room and placed supine on the operating table.  All pressure points were padded.  Sequential compression devices were placed on bilateral lower extremities and verified to be operational.  Preinduction timeout was performed.  Monitored anesthesia care was commenced.  The planned longitudinally-oriented incision was marked and infiltrated in the subdermis with 1% lidocaine containing epinephrine. Once done, the left upper extremity was prepped and draped in usual sterile fashion. An Esmarch was used to exsanguinate the extremity and the tourniquet was inflated on the forearm to 250 mm Hg. Next, the longitudinally-oriented incision marked 2 mm from the distal-most wrist crease to Palumbo's cardinal line, along the imaginary line along the radial side of the fourth ray, was made with a #15 blade. Once through skin, the palmar fascia was identified and longitudinally divided. A self-retaining retractor was place and the transverse carpal ligament fibers were identified. Of note, I identified the ulnar-most aspect of the ligament and there was no identified trans-ligamentous recurrent motor branch seen. Next, the TCL was divided longitudinally under direct visualization. It was further divided until the distal-most extent using a #15 blade. Once the deep palmar fat was seen, a Santosh's tenotomy was used to spread to ensure there was no incomplete release distally. Next, the proximal TCL as well as the distal antebrachial fascia was divided under direct visualization with the wrist slight flexed and the nerve protected. Next, my index finger swept under the leaflets of the TCL to ensure once more that it was fully released. Next, the tourniquet was  "deflated. Any bleeding from the fat was bipolar cauterized. The skin was then closed with 4-0 Nylon suture in interrupted simple and horizontal mattress fashion. The incision was dressed with bacitracin and Xeroform, 4 x 4\" gauze, Webril and an ACE bandage. Patient was woken up and transferred to the postanesthesia recovery area without any events and no pain.      Postoperative plan: Patient will be seen in the clinic in approximately 10-14 days for suture removal.      Kyle Masterson MD, PhD, FACS    "

## 2025-06-18 NOTE — BRIEF OP NOTE
[facility] [place]    Brief Operative Note    Pre-operative diagnosis: Bilateral carpal tunnel syndrome [G56.03]  Post-operative diagnosis Same as pre-operative diagnosis    Procedure: Left open carpal tunnel release, Left - Wrist    Surgeon: Surgeons and Role:     * Kyle Masterson MD - Primary     * Cherise Wolfe MD  Anesthesia: MAC with Local   Estimated Blood Loss: < 5 cc    Drains: None  Specimens: * No specimens in log *  Findings:   Refer to Op Note.  Complications: None.  Implants: * No implants in log *    Cherise Bailon MD  Plastic Surgery Resident, PGY-1

## 2025-06-18 NOTE — PROGRESS NOTES
Ortho Hand    No changes in history or examination.  Medically optimized.    OR today for left open carpal tunnel release.    Discussed the risks of surgery, including but not limited to: infection, bleeding, pain, poor scarring, injury to the nerves, neuroma formation, injurt to tendons, incomplete release, recurrence of carpal tunnel syndrome, need for revision surgery, complex regional pain syndrome. Despite these risks, patient consents to and would like to proceed with left open carpal tunnel release.  All questions answered.    Kyle Masterson MD, PhD, FACS

## 2025-06-18 NOTE — ANESTHESIA PREPROCEDURE EVALUATION
Anesthesia Pre-Procedure Evaluation    Patient: Aleshia Land   MRN: [id number] : 1975          Procedure : Procedure(s):  Left open carpal tunnel release         Past Medical History:   Diagnosis Date    Asthma     HTN (hypertension)       Past Surgical History:   Procedure Laterality Date    APPENDECTOMY        No Known Allergies   Social History     Tobacco Use    Smoking status: Never     Passive exposure: Never    Smokeless tobacco: Not on file   Substance Use Topics    Alcohol use: Yes     Comment: Occasional      Wt Readings from Last 1 Encounters:   25 86.2 kg (190 lb)        Anesthesia Evaluation   Pt has had prior anesthetic.     No history of anesthetic complications       ROS/MED HX  ENT/Pulmonary:     (+)                     Intermittent, asthma  Treatment: Inhaler prn,              (-) tobacco use   Neurologic: Comment: Carpal tunnel   (-) no seizures and no CVA   Cardiovascular:     (+)  hypertension- -   -  - -                                 Previous cardiac testing   Echo: Date: Results:    Stress Test:  Date: Results:    ECG Reviewed:  Date:  Results:  NSR  Cath:  Date: Results:   (-) taking anticoagulants/antiplatelets   METS/Exercise Tolerance:  Comment: Walking 15-20 minutes multiple times per week. Works on feet. Can ascend stairs. Denies PALACIO or CP    Hematologic:  - neg hematologic  ROS  (-) history of blood clots and history of blood transfusion   Musculoskeletal:       GI/Hepatic:  - neg GI/hepatic ROS  (-) GERD   Renal/Genitourinary:  - neg Renal ROS     Endo:  - neg endo ROS  (-) chronic steroid usage   Psychiatric/Substance Use:       Infectious Disease:  - neg infectious disease ROS     Malignancy:       Other:              Physical Exam  Airway  Mallampati: II  TM distance: >3 FB  Neck ROM: full  Upper bite lip test: II    Cardiovascular - normal exam   Dental   (+) Modest Abnormalities - crowns, retainers, 1 or 2 missing teeth      Pulmonary - normal examBreath  "sounds clear to auscultation        Neurological - normal exam  She appears awake, alert and oriented x3.    Other Findings       OUTSIDE LABS:  CBC:   Lab Results   Component Value Date    WBC 5.8 04/22/2025    HGB 13.5 04/22/2025    HCT 39.4 04/22/2025     04/22/2025     BMP:   Lab Results   Component Value Date     04/22/2025    POTASSIUM 4.0 04/22/2025    CHLORIDE 102 04/22/2025    CO2 24 04/22/2025    BUN 15.0 04/22/2025    CR 0.72 04/22/2025    GLC 90 04/22/2025     COAGS: No results found for: \"PTT\", \"INR\", \"FIBR\"  POC: No results found for: \"BGM\", \"HCG\", \"HCGS\"  HEPATIC: No results found for: \"ALBUMIN\", \"PROTTOTAL\", \"ALT\", \"AST\", \"GGT\", \"ALKPHOS\", \"BILITOTAL\", \"BILIDIRECT\", \"IMELDA\"  OTHER:   Lab Results   Component Value Date    A1C 5.8 (H) 04/22/2025    LEONORA 9.0 04/22/2025    TSH 2.54 04/22/2025       Anesthesia Plan    ASA Status:  2      NPO Status: NPO Appropriate   Anesthesia Type: MAC.   Techniques and Equipment:       - Monitoring Plan: standard ASA monitoring     Consents    Anesthesia Plan(s) and associated risks, benefits, and realistic alternatives discussed. Questions answered and patient/representative(s) expressed understanding.     - Discussed: CRNA     - Discussed with:  Patient        - Pt is DNR/DNI Status: no DNR     Blood Consent:      - Discussed with: patient.     Postoperative Care    Pain management: non-narcotic analgesics, multimodal analgesia.     Comments:                   Evelio Velazco MD    I have reviewed the pertinent notes and labs in the chart from the past 30 days and (re)examined the patient.  Any updates or changes from those notes are reflected in this note.    Clinically Significant Risk Factors Present on Admission                   # Hypertension: Home medication list includes antihypertensive(s)           # Obesity: Estimated body mass index is 39.71 kg/m  as calculated from the following:    Height as of this encounter: 1.473 m (4' 10\").    Weight as " of this encounter: 86.2 kg (190 lb).       # Asthma: noted on problem list

## 2025-06-18 NOTE — DISCHARGE INSTRUCTIONS
Hand Surgery Discharge Instructions    Patient has been treated for left carpal tunnel syndrome with Dr. Masterson on 6/18/2025.     Care: Please keep the splint/cast/dressing clean and dry at all times.  You can remove the dressing after 3-5 days.  Once removed, you may wash the hands with warm water and soap, air or gently pat dry, and apply a large Band-Aid.    Medications: You can take Ibuprofen 400-800 mg and Tylenol 650 mg for pain relief. Please take each every 6 hours, and for optimal pain relief - please stagger the medications so that you are taking one or the other every 3 hours. If you had a nerve block, the effects may last 8-12 hours. If you have been prescribed additional pain medications, please take as instructed and as needed. If you are taking additional pain medications, please do not exceed 4000 mg of Tylenol daily from all sources. Also, if you are taking narcotic medications, please do not operate heavy machinery or drive. If you have been prescribed antibiotics, please also take as instructed.     Diet: Start with clear liquids and slow down if nauseated. Advance the diet as tolerated.    Weight-bearing/Activities: Move your fingers to prevent stiffness. Elevate the affected extremity to improve throbbing sensation or pain. No strenuous activities and do not raise your heart rate above 100 bpm for the first few weeks. Limit your weight-bearing with the affected extremity to 5-7 pounds unless otherwise specified.    ER Instructions: Please call the office and/or consider return to the ER if you experience worsening pain not relieved by medications, increased swelling, redness or high fevers >101F or if there are unexpected problems like shortness of breath.    Post-op follow-up: Clinic in 10-14 days with Dr. Masterson or his PA at the [facility]. Please call our Ortho triage line if you have any questions or concerns before your clinic visit: [phone].    Kyle  MD Aleja, PhD, [facility]  Home Care Following Anesthesia  For 24 hours after surgery:  Get plenty of rest.  A responsible adult must stay with you for at least 24 hours after you leave the surgery center.  Do not drive or use heavy equipment.  If you have weakness or tingling, don't drive or use heavy equipment until this feeling goes away.   Do not drink alcohol.   Avoid strenuous or risky activities.  Ask for help when climbing stairs.  You may feel lightheaded.  IF so, sit for a few minutes before standing.  Have someone help you get up.   If you have nausea (feel sick to your stomach): Drink only clear liquids such as apple juice, ginger ale, broth or 7-Up.  Rest may also help.  Be sure to drink enough fluids.  Move to a regular diet as you feel able.   You may have a slight fever.  Call the doctor if your fever is over 100 F (37.7 C) (taken under the tongue) or lasts longer than 24 hours.  You may have a dry mouth, a sore throat, muscle aches or trouble sleeping. These should go away after 24 hours.  Do not make important or legal decisions.   It is recommended to avoid smoking.               Tips for taking pain medications  To get the best pain relief possible, remember these points:  Take pain medications as directed, before pain becomes severe.  Pain medication can upset your stomach: taking it with food may help.  Constipation is a common side effect of pain medication. Drink plenty of  fluids.  Eat foods high in fiber. Take a stool softener if recommended by your doctor or pharmacist.  Do not drink alcohol, drive or operate machinery while taking pain medications.  Ask about other ways to control pain, such as with heat, ice or relaxation.    Tylenol/Acetaminophen Consumption    If you feel your pain relief is insufficient, you may take Tylenol/Acetaminophen in addition to your narcotic pain medication.   Be careful not to exceed 4,000 mg of  Tylenol/Acetaminophen in a 24 hour period from all sources.  If you are taking extra strength Tylenol/acetaminophen (500 mg), the maximum dose is 8 tablets in 24 hours.  If you are taking regular strength acetaminophen (325 mg), the maximum dose is 12 tablets in 24 hours.    Call a doctor for any of the following:  Signs of infection (fever, growing tenderness at the surgery site, a large amount of drainage or bleeding, severe pain, foul-smelling drainage, redness, swelling).  It has been over 8 to 10 hours since surgery and you are still not able to urinate (pass water).  Headache for over 24 hours.  Numbness, tingling or weakness the day after surgery (if you had spinal anesthesia).  Signs of Covid-19 infection (temperature over 100 degrees, shortness of breath, cough, loss of taste/smell, generalized body aches, persistent headache, chills, sore throat, nausea/vomiting/diarrhea)    Your doctor is:       Dr. Kyle Masterson, Plastic Surgery: [phone]               After hours and weekends call the hospital @ [phone] and ask for the resident on call for:  Plastics  For emergency care, call the:  [facility]:  [phone] (TTY for hearing impaired: [phone])

## 2025-06-18 NOTE — ANESTHESIA POSTPROCEDURE EVALUATION
Patient: Aleshia Land    Procedure: Procedure(s):  Left open carpal tunnel release       Anesthesia Type:  MAC    Note:  Disposition: Outpatient   Postop Pain Control: Uneventful            Sign Out: Well controlled pain   PONV: No   Neuro/Psych: Uneventful            Sign Out: Acceptable/Baseline neuro status   Airway/Respiratory: Uneventful            Sign Out: Acceptable/Baseline resp. status   CV/Hemodynamics: Uneventful            Sign Out: Acceptable CV status; No obvious hypovolemia; No obvious fluid overload   Other NRE: NONE   DID A NON-ROUTINE EVENT OCCUR? No           Last vitals:  Vitals Value Taken Time   /79 06/18/25 14:40   Temp 36.3  C (97.4  F) 06/18/25 14:40   Pulse 68 06/18/25 14:40   Resp 17 06/18/25 14:40   SpO2 96 % 06/18/25 14:38   Vitals shown include unfiled device data.    Electronically Signed By: Alfredo Peoples DO  June 18, 2025  2:45 PM

## 2025-06-27 NOTE — PROGRESS NOTES
Date of Service: Jun 30, 2025    Reason for visit: Postoperative follow-up    Date of Surgery: 6/18/25    Procedure Performed: left open carpal tunnel release    Surgeon: Dr. Perfecto Masterson    Interval events: Aleshia Land comes to see me in follow-up today. Numbness and tingling present preoperatively IS improved. No fevers or chills. No longer taking pain medication. No other issues to report.    Patient does report over the last couple weeks she has noticed clicking to the left middle finger.  She states it gets stuck in a flexed position in the morning and requires manual unlocking.    Physical examination:  Well-developed, well-nourished and in no acute distress.  Alert and oriented to surroundings.  On examination of the left wrist, incision is well-approximated. There is no erythema, drainage, or dehiscence. Sensation is intact in median, radial and ulnar nerve distributions. Thumb opposition is intact. Patient can actively flex and extend all digits and thumb. Interosseous muscles, EPL, and FDP-2 fire. Fingers are warm and well-perfused.  There is a palpable tender nodule at the level of the A1 pulley of the middle finger.  No appreciable clicking today.    Assessment: Aleshia Land is a 50 year old y/o female who presents with the following:   Bilateral carpal tunnel syndrome. Now S/p left open carpal tunnel release.   Left middle trigger finger.     Plan:   Patient should continue to wash wound with soap and water daily and pat dry. No immersing the wound in water for prolonged periods such as tub baths or dishwashing without gloves until wound has healed. Do not lift anything heavier than a coffee cup or do forceful gripping with your operative hand until the wound has healed as this may split the wound open. This will typically take 1-2 more weeks. You can use the hand for light activities like eating, shaving, typing, and brushing your teeth. After the wound has completley healed, you may progress your  activity gradually according to your comfort level, but heavy lifting (> 10 pounds),  forceful gripping, and weight bearing directly on the palm (such as pushups) are discouraged for the first 6 weeks after surgery to give the area time to heal. Painful activities should be avoided. The patient will follow-up at 6 weeks postop if they have any questions or concerns regarding their continued progress. However, if they are back to full activity, are satisfied with the outcome of surgery, and have no remaining questions at that time, this visit may be deferred. All questions were answered and the patient was in agreement with the plan.     We discussed treatment for her right carpal tunnel syndrome.  Recommend night splinting.  Patient is interested in a corticosteroid injection.  She does not want get this today, and follow-up in the next week or 2 for this.  For management of her trigger finger, we discussed conservative management versus corticosteroid injections.  Patient is interested in a steroid injection.  Again she will follow-up in the next 1 to 2 weeks for injection.    DANELLE GAITAN PA-C  Orthopaedic Surgery

## 2025-06-30 ENCOUNTER — OFFICE VISIT (OUTPATIENT)
Dept: ORTHOPEDICS | Facility: CLINIC | Age: 50
End: 2025-06-30
Payer: COMMERCIAL

## 2025-06-30 DIAGNOSIS — G56.03 BILATERAL CARPAL TUNNEL SYNDROME: Primary | ICD-10-CM

## 2025-06-30 DIAGNOSIS — M65.30 TRIGGER FINGER, ACQUIRED: ICD-10-CM

## 2025-06-30 DIAGNOSIS — Z98.890 POST-OPERATIVE STATE: ICD-10-CM

## 2025-06-30 PROCEDURE — 99212 OFFICE O/P EST SF 10 MIN: CPT | Mod: 24 | Performed by: PHYSICIAN ASSISTANT

## 2025-06-30 NOTE — LETTER
6/30/2025      Aleshia Land  [address]  [place] [address]      Dear Colleague,    Thank you for referring your patient, Aleshia Land, to the [facility]. Please see a copy of my visit note below.    Date of Service: Jun 30, 2025    Reason for visit: Postoperative follow-up    Date of Surgery: 6/18/25    Procedure Performed: left open carpal tunnel release    Surgeon: Dr. Perfecto Masterson    Interval events: Aleshia Land comes to see me in follow-up today. Numbness and tingling present preoperatively IS improved. No fevers or chills. No longer taking pain medication. No other issues to report.    Patient does report over the last couple weeks she has noticed clicking to the left middle finger.  She states it gets stuck in a flexed position in the morning and requires manual unlocking.    Physical examination:  Well-developed, well-nourished and in no acute distress.  Alert and oriented to surroundings.  On examination of the left wrist, incision is well-approximated. There is no erythema, drainage, or dehiscence. Sensation is intact in median, radial and ulnar nerve distributions. Thumb opposition is intact. Patient can actively flex and extend all digits and thumb. Interosseous muscles, EPL, and FDP-2 fire. Fingers are warm and well-perfused.  There is a palpable tender nodule at the level of the A1 pulley of the middle finger.  No appreciable clicking today.    Assessment: Aleshia Land is a 50 year old y/o female who presents with the following:   Bilateral carpal tunnel syndrome. Now S/p left open carpal tunnel release.   Left middle trigger finger.     Plan:   Patient should continue to wash wound with soap and water daily and pat dry. No immersing the wound in water for prolonged periods such as tub baths or dishwashing without gloves until wound has healed. Do not lift anything heavier than a coffee cup or do forceful gripping with your operative hand until  the wound has healed as this may split the wound open. This will typically take 1-2 more weeks. You can use the hand for light activities like eating, shaving, typing, and brushing your teeth. After the wound has completley healed, you may progress your activity gradually according to your comfort level, but heavy lifting (> 10 pounds),  forceful gripping, and weight bearing directly on the palm (such as pushups) are discouraged for the first 6 weeks after surgery to give the area time to heal. Painful activities should be avoided. The patient will follow-up at 6 weeks postop if they have any questions or concerns regarding their continued progress. However, if they are back to full activity, are satisfied with the outcome of surgery, and have no remaining questions at that time, this visit may be deferred. All questions were answered and the patient was in agreement with the plan.     We discussed treatment for her right carpal tunnel syndrome.  Recommend night splinting.  Patient is interested in a corticosteroid injection.  She does not want get this today, and follow-up in the next week or 2 for this.  For management of her trigger finger, we discussed conservative management versus corticosteroid injections.  Patient is interested in a steroid injection.  Again she will follow-up in the next 1 to 2 weeks for injection.    ELISA GAITAN PA-C  Orthopaedic Surgery    Again, thank you for allowing me to participate in the care of your patient.        Sincerely,        Elisa Gaitan PA-C    Electronically signed

## 2025-06-30 NOTE — NURSING NOTE
Reason For Visit:   Chief Complaint   Patient presents with    Surgical Followup     Post op Left open carpal tunnel release  DOS: 6/18/25       Primary MD: No Ref-Primary, Physician  Ref. MD: Polly    Age: 50 year old    ?  No      LMP  (LMP Unknown)       Pain Assessment  Patient Currently in Pain: Yes  Patient's Stated Pain Goal: 2  Primary Pain Location: Wrist (left)  Pain Descriptors: Intermittent, Discomfort, Sore    Hand Dominance Evaluation  Hand Dominance: Right          QuickDASH Assessment      5/30/2025     7:47 AM   QuickDASH Main   1. Open a tight or new jar Severe difficulty   2. Do heavy household chores (e.g., wash walls, floors) Severe difficulty   3. Carry a shopping bag or briefcase Mild difficulty   4. Wash your back Severe difficulty   5. Use a knife to cut food Mild difficulty   6. Recreational activities in which you take some force or impact through your arm, shoulder or hand (e.g., golf, hammering, tennis, etc.) Moderate difficulty   7. During the past week, to what extent has your arm, shoulder or hand problem interfered with your normal social activities with family, friends, neighbours or groups Slightly   8. During the past week, were you limited in your work or other regular daily activities as a result of your arm, shoulder or hand problem Moderately limited   9. Arm, shoulder or hand pain Moderate   10.Tingling (pins and needles) in your arm,shoulder or hand Severe   11. During the past week, how much difficulty have you had sleeping because of the pain in your arm, shoulder or hand Mild difficulty   Quickdash Ability Score 50          Current Outpatient Medications   Medication Sig Dispense Refill    albuterol (PROAIR HFA/PROVENTIL HFA/VENTOLIN HFA) 108 (90 BASE) MCG/ACT Inhaler Inhale 2 puffs into the lungs every 4 hours as needed 1 Inhaler 3    losartan (COZAAR) 25 MG tablet Take 1 tablet (25 mg) by mouth daily. 60 tablet 0       No Known Allergies    JODEE CABA, ATC

## 2025-07-02 ENCOUNTER — OFFICE VISIT (OUTPATIENT)
Dept: FAMILY MEDICINE | Facility: CLINIC | Age: 50
End: 2025-07-02
Payer: COMMERCIAL

## 2025-07-02 VITALS
SYSTOLIC BLOOD PRESSURE: 136 MMHG | OXYGEN SATURATION: 99 % | BODY MASS INDEX: 36.89 KG/M2 | DIASTOLIC BLOOD PRESSURE: 83 MMHG | TEMPERATURE: 97.2 F | RESPIRATION RATE: 20 BRPM | HEART RATE: 72 BPM | WEIGHT: 195.4 LBS | HEIGHT: 61 IN

## 2025-07-02 DIAGNOSIS — I10 ESSENTIAL HYPERTENSION: Primary | ICD-10-CM

## 2025-07-02 DIAGNOSIS — Z23 NEED FOR SHINGLES VACCINE: ICD-10-CM

## 2025-07-02 DIAGNOSIS — J45.30 MILD PERSISTENT ASTHMA WITHOUT COMPLICATION: ICD-10-CM

## 2025-07-02 PROCEDURE — 90471 IMMUNIZATION ADMIN: CPT | Performed by: NURSE PRACTITIONER

## 2025-07-02 PROCEDURE — 99214 OFFICE O/P EST MOD 30 MIN: CPT | Mod: 25 | Performed by: NURSE PRACTITIONER

## 2025-07-02 PROCEDURE — 90750 HZV VACC RECOMBINANT IM: CPT | Performed by: NURSE PRACTITIONER

## 2025-07-02 RX ORDER — ALBUTEROL SULFATE 90 UG/1
2 INHALANT RESPIRATORY (INHALATION) EVERY 4 HOURS PRN
Qty: 8.5 G | Refills: 3 | Status: SHIPPED | OUTPATIENT
Start: 2025-07-02

## 2025-07-02 RX ORDER — LOSARTAN POTASSIUM 25 MG/1
25 TABLET ORAL DAILY
Qty: 90 TABLET | Refills: 0 | Status: SHIPPED | OUTPATIENT
Start: 2025-07-02

## 2025-07-02 ASSESSMENT — PAIN SCALES - GENERAL: PAINLEVEL_OUTOF10: NO PAIN (0)

## 2025-07-02 NOTE — PROGRESS NOTES
"  Assessment & Plan     Essential hypertension  Controlled, compliant with medications.   Denies symptoms. Continue current dose  Discussed DASH diet and dietary sodium restrictions.   Increase dietary efforts and physical activity.  - losartan (COZAAR) 25 MG tablet; Take 1 tablet (25 mg) by mouth daily.    Mild persistent asthma without complication  Reports symptoms stable  - albuterol (PROAIR HFA/PROVENTIL HFA/VENTOLIN HFA) 108 (90 Base) MCG/ACT inhaler; Inhale 2 puffs into the lungs every 4 hours as needed for wheezing or shortness of breath.    Need for shingles vaccine  - ZOSTER RECOMBINANT ADJUVANTED (SHINGRIX)    BMI  Estimated body mass index is 36.92 kg/m  as calculated from the following:    Height as of this encounter: 1.549 m (5' 1\").    Weight as of this encounter: 88.6 kg (195 lb 6.4 oz).             Lidya Monk is a 50 year old, presenting for the following health issues:  Hypertension (Has not taken BP meds in 2 weeks)        7/2/2025     6:48 AM   Additional Questions   Roomed by richard   Accompanied by self         7/2/2025     6:48 AM   Patient Reported Additional Medications   Patient reports taking the following new medications none     History of Present Illness       Reason for visit:  Follow up    She eats 2-3 servings of fruits and vegetables daily.She consumes 2 sweetened beverage(s) daily.She exercises with enough effort to increase her heart rate 10 to 19 minutes per day.  She exercises with enough effort to increase her heart rate 3 or less days per week.   She is taking medications regularly.          Hypertension Follow-up    Do you check your blood pressure regularly outside of the clinic? No   Are you following a low salt diet? No  Are your blood pressures ever more than 140 on the top number (systolic) OR more   than 90 on the bottom number (diastolic), for example 140/90? N/A    BP Readings from Last 2 Encounters:   07/02/25 136/83   06/18/25 132/79           Review of " "Systems  Constitutional, HEENT, cardiovascular, pulmonary, gi and gu systems are negative, except as otherwise noted.      Objective    /83 (BP Location: Left arm, Patient Position: Sitting, Cuff Size: Adult Large)   Pulse 72   Temp 97.2  F (36.2  C) (Temporal)   Resp 20   Ht 1.549 m (5' 1\")   Wt 88.6 kg (195 lb 6.4 oz)   LMP  (LMP Unknown)   SpO2 99%   BMI 36.92 kg/m    Body mass index is 36.92 kg/m .  Physical Exam   GENERAL: alert and no distress  NECK: no adenopathy, no asymmetry, masses, or scars  RESP: lungs clear to auscultation - no rales, rhonchi or wheezes  CV: regular rate and rhythm, normal S1 S2, no S3 or S4, no murmur, click or rub, no peripheral edema            Signed Electronically by: MAURI Crowley CNP    "

## 2025-07-02 NOTE — NURSING NOTE
Prior to immunization administration, verified patients identity using patient s name and date of birth. Please see Immunization Activity for additional information.     Screening Questionnaire for Adult Immunization    Are you sick today?   No   Do you have allergies to medications, food, a vaccine component or latex?   No   Have you ever had a serious reaction after receiving a vaccination?   No   Do you have a long-term health problem with heart, lung, kidney, or metabolic disease (e.g., diabetes), asthma, a blood disorder, no spleen, complement component deficiency, a cochlear implant, or a spinal fluid leak?  Are you on long-term aspirin therapy?   No   Do you have cancer, leukemia, HIV/AIDS, or any other immune system problem?   No   Do you have a parent, brother, or sister with an immune system problem?   No   In the past 3 months, have you taken medications that affect  your immune system, such as prednisone, other steroids, or anticancer drugs; drugs for the treatment of rheumatoid arthritis, Crohn s disease, or psoriasis; or have you had radiation treatments?   No   Have you had a seizure, or a brain or other nervous system problem?   No   During the past year, have you received a transfusion of blood or blood    products, or been given immune (gamma) globulin or antiviral drug?   No   For women: Are you pregnant or is there a chance you could become       pregnant during the next month?   No   Have you received any vaccinations in the past 4 weeks?   No     Immunization questionnaire answers were all negative.      Patient instructed to remain in clinic for 15 minutes afterwards, and to report any adverse reactions.     Screening performed by Debra Castano MA on 7/2/2025 at 7:35 AM.

## 2025-07-11 ENCOUNTER — OFFICE VISIT (OUTPATIENT)
Dept: ORTHOPEDICS | Facility: CLINIC | Age: 50
End: 2025-07-11
Payer: COMMERCIAL

## 2025-07-11 DIAGNOSIS — G56.03 BILATERAL CARPAL TUNNEL SYNDROME: Primary | ICD-10-CM

## 2025-07-11 DIAGNOSIS — M65.30 TRIGGER FINGER, ACQUIRED: ICD-10-CM

## 2025-07-11 PROCEDURE — 20526 THER INJECTION CARP TUNNEL: CPT | Mod: 59 | Performed by: PHYSICIAN ASSISTANT

## 2025-07-11 PROCEDURE — 20550 NJX 1 TENDON SHEATH/LIGAMENT: CPT | Mod: F2 | Performed by: PHYSICIAN ASSISTANT

## 2025-07-11 RX ADMIN — DEXAMETHASONE SODIUM PHOSPHATE 4 MG: 4 INJECTION, SOLUTION INTRA-ARTICULAR; INTRALESIONAL; INTRAMUSCULAR; INTRAVENOUS; SOFT TISSUE at 15:01

## 2025-07-11 RX ADMIN — LIDOCAINE HYDROCHLORIDE 2 ML: 10 INJECTION, SOLUTION EPIDURAL; INFILTRATION; INTRACAUDAL; PERINEURAL at 15:01

## 2025-07-11 RX ADMIN — DEXAMETHASONE SODIUM PHOSPHATE 4 MG: 4 INJECTION, SOLUTION INTRA-ARTICULAR; INTRALESIONAL; INTRAMUSCULAR; INTRAVENOUS; SOFT TISSUE at 15:00

## 2025-07-11 RX ADMIN — LIDOCAINE HYDROCHLORIDE 1 ML: 10 INJECTION, SOLUTION EPIDURAL; INFILTRATION; INTRACAUDAL; PERINEURAL at 15:00

## 2025-07-11 NOTE — PROGRESS NOTES
Hand / Upper Extremity Injection/Arthrocentesis: L long A1    Date/Time: 2025 3:00 PM    Performed by: Elisa Altamirano PA-C  Authorized by: Elisa Altamirano PA-C    Indications:  Pain  Needle Size:  25 G  Guidance: landmark    Condition: trigger finger    Location:  Long finger    Site:  L long A1  Medications:  4 mg dexAMETHasone 4 MG/ML; 1 mL lidocaine (PF) 1 %  Outcome:  Tolerated well, no immediate complications  Procedure discussed: discussed risks, benefits, and alternatives    Consent Given by:  Patient  Timeout: timeout called immediately prior to procedure    Prep: patient was prepped and draped in usual sterile fashion    Hand / Upper Extremity Injection/Arthrocentesis: R carpal tunnel    Date/Time: 2025 3:01 PM    Performed by: Elisa Altamirano PA-C  Authorized by: Elisa Altamirano PA-C    Indications:  Pain  Needle Size:  25 G  Condition: carpal tunnel      Site:  R carpal tunnel  Medications:  4 mg dexAMETHasone 4 MG/ML; 2 mL lidocaine (PF) 1 %  Outcome:  Tolerated well, no immediate complications  Procedure discussed: discussed risks, benefits, and alternatives    Consent Given by:  Patient  Timeout: timeout called immediately prior to procedure    Prep: patient was prepped and draped in usual sterile fashion        [facility] [address] [address] [phone]  Dept: [phone]  ______________________________________________________________________________    Patient: Aleshia Land   : 1975   MRN: [id number]   2025    INVASIVE PROCEDURE SAFETY CHECKLIST    Date: 2025   Procedure: Left middle trigger finger and right carpal tunnel steroid injections  Patient Name: Aleshia Land  MRN: [id number]  YOB: 1975    Action: Complete sections as appropriate. Any discrepancy results in a HARD COPY until resolved.     PRE PROCEDURE:  Patient ID verified with 2  identifiers (name and  or MRN): Yes  Procedure and site verified with patient/designee (when able): Yes  Accurate consent documentation in medical record: Yes  H&P (or appropriate assessment) documented in medical record: Yes  H&P must be up to 20 days prior to procedure and updates within 24 hours of procedure as applicable: Yes  Relevant diagnostic and radiology test results appropriately labeled and displayed as applicable: NA  Procedure site(s) marked with provider initials: NA    TIMEOUT:  Time-Out performed immediately prior to starting procedure, including verbal and active participation of all team members addressing the following:Yes  * Correct patient identify  * Confirmed that the correct side and site are marked  * An accurate procedure consent form  * Agreement on the procedure to be done  * Correct patient position  * Relevant images and results are properly labeled and appropriately displayed  * The need to administer antibiotics or fluids for irrigation purposes during the procedure as applicable   * Safety precautions based on patient history or medication use    DURING PROCEDURE: Verification of correct person, site, and procedures any time the responsibility for care of the patient is transferred to another member of the care team.       The following medications were given:         Prior to injection, verified patient identity using patient's name and date of birth.  Due to injection administration, patient instructed to remain in clinic for 15 minutes  afterwards, and to report any adverse reaction to me immediately.    Trigger point injection was performed. Carpal tunnel injection performed.  Medication Name: Dexamethasone Sodium Phosphate x2 NDC 39702-503-44  Drug Amount Wasted:  None.  Vial/Syringe: Single dose vial  Expiration Date:  26    Medication Name Lidocaine 1% NDC 77805-481-81    Scribed by Briana Linton ATC for Elisa Altamirano PA-C on 2025 at 3:03pm based on the  provider's statements to me.     Briana Linton, ATC

## 2025-07-11 NOTE — NURSING NOTE
Reason For Visit:   Chief Complaint   Patient presents with    Surgical Followup     Post op Left open carpal tunnel release - Left. DOS: 6/18/25. Presents with bilateral carpal tunnel syndrome and left middle finger trigger finger.        Primary MD: Jovan - Eboni Cantu [facility]  Ref. MD: Est    Age: 50 year old    ?  No      LMP  (LMP Unknown)       Pain Assessment  Patient Currently in Pain: Yes  Primary Pain Location: Finger (Comment which one) (Left middle finger)  Pain Descriptors:  (Consistent catching)    Hand Dominance Evaluation  Hand Dominance: Right          QuickDASH Assessment      5/30/2025     7:47 AM   QuickDASH Main   1. Open a tight or new jar Severe difficulty   2. Do heavy household chores (e.g., wash walls, floors) Severe difficulty   3. Carry a shopping bag or briefcase Mild difficulty   4. Wash your back Severe difficulty   5. Use a knife to cut food Mild difficulty   6. Recreational activities in which you take some force or impact through your arm, shoulder or hand (e.g., golf, hammering, tennis, etc.) Moderate difficulty   7. During the past week, to what extent has your arm, shoulder or hand problem interfered with your normal social activities with family, friends, neighbours or groups Slightly   8. During the past week, were you limited in your work or other regular daily activities as a result of your arm, shoulder or hand problem Moderately limited   9. Arm, shoulder or hand pain Moderate   10.Tingling (pins and needles) in your arm,shoulder or hand Severe   11. During the past week, how much difficulty have you had sleeping because of the pain in your arm, shoulder or hand Mild difficulty   Quickdash Ability Score 50          Current Outpatient Medications   Medication Sig Dispense Refill    albuterol (PROAIR HFA/PROVENTIL HFA/VENTOLIN HFA) 108 (90 Base) MCG/ACT inhaler Inhale 2 puffs into the lungs every 4 hours as needed for wheezing or shortness of breath.  8.5 g 3    losartan (COZAAR) 25 MG tablet Take 1 tablet (25 mg) by mouth daily. 90 tablet 0       No Known Allergies    Briana Linton, ATC

## 2025-07-11 NOTE — LETTER
2025      [facility] Land  [address] [facility] [facility] [address]      Dear Colleague,    Thank you for referring your patient, Aleshia Land, to the [facility]. Please see a copy of my visit note below.    Hand / Upper Extremity Injection/Arthrocentesis: L long A1    Date/Time: 2025 3:00 PM    Performed by: Elisa Altamirano PA-C  Authorized by: Elisa Altamirano PA-C    Indications:  Pain  Needle Size:  25 G  Guidance: landmark    Condition: trigger finger    Location:  Long finger    Site:  L long A1  Medications:  4 mg dexAMETHasone 4 MG/ML; 1 mL lidocaine (PF) 1 %  Outcome:  Tolerated well, no immediate complications  Procedure discussed: discussed risks, benefits, and alternatives    Consent Given by:  Patient  Timeout: timeout called immediately prior to procedure    Prep: patient was prepped and draped in usual sterile fashion    Hand / Upper Extremity Injection/Arthrocentesis: R carpal tunnel    Date/Time: 2025 3:01 PM    Performed by: Elisa Altamirano PA-C  Authorized by: Elisa Altamirano PA-C    Indications:  Pain  Needle Size:  25 G  Condition: carpal tunnel      Site:  R carpal tunnel  Medications:  4 mg dexAMETHasone 4 MG/ML; 2 mL lidocaine (PF) 1 %  Outcome:  Tolerated well, no immediate complications  Procedure discussed: discussed risks, benefits, and alternatives    Consent Given by:  Patient  Timeout: timeout called immediately prior to procedure    Prep: patient was prepped and draped in usual sterile fashion        [facility] [address] [id number] [phone]  Dept: [phone]  ______________________________________________________________________________    Patient: Aleshia Land   : 1975   MRN: [id number]   2025    INVASIVE PROCEDURE SAFETY CHECKLIST    Date: 2025   Procedure: Left middle trigger finger and right  carpal tunnel steroid injections  Patient Name: Aleshia Land  MRN: [id number]  YOB: 1975    Action: Complete sections as appropriate. Any discrepancy results in a HARD COPY until resolved.     PRE PROCEDURE:  Patient ID verified with 2 identifiers (name and  or MRN): Yes  Procedure and site verified with patient/designee (when able): Yes  Accurate consent documentation in medical record: Yes  H&P (or appropriate assessment) documented in medical record: Yes  H&P must be up to 20 days prior to procedure and updates within 24 hours of procedure as applicable: Yes  Relevant diagnostic and radiology test results appropriately labeled and displayed as applicable: NA  Procedure site(s) marked with provider initials: NA    TIMEOUT:  Time-Out performed immediately prior to starting procedure, including verbal and active participation of all team members addressing the following:Yes  * Correct patient identify  * Confirmed that the correct side and site are marked  * An accurate procedure consent form  * Agreement on the procedure to be done  * Correct patient position  * Relevant images and results are properly labeled and appropriately displayed  * The need to administer antibiotics or fluids for irrigation purposes during the procedure as applicable   * Safety precautions based on patient history or medication use    DURING PROCEDURE: Verification of correct person, site, and procedures any time the responsibility for care of the patient is transferred to another member of the care team.       The following medications were given:         Prior to injection, verified patient identity using patient's name and date of birth.  Due to injection administration, patient instructed to remain in clinic for 15 minutes  afterwards, and to report any adverse reaction to me immediately.    Trigger point injection was performed. Carpal tunnel injection performed.  Medication Name: Dexamethasone Sodium Phosphate x2  NDC 63118-937-31  Drug Amount Wasted:  None.  Vial/Syringe: Single dose vial  Expiration Date:  7/1/26    Medication Name Lidocaine 1% NDC 91494-199-37    Scribed by Briana Linton, LUAN for Elisa Altamirano PA-C on July 11, 2025 at 3:03pm based on the provider's statements to me.     Briana Linton, LUAN      Date of Service: Jul 11, 2025    Chief Complaint:   Chief Complaint   Patient presents with     Surgical Followup     Post op Left open carpal tunnel release - Left. DOS: 6/18/25. Presents with bilateral carpal tunnel syndrome and left middle finger trigger finger.        Interval events: Aleshia Land is a 50 year old female who presents today in follow-up for right carpal tunnel steroid injection and left middle trigger finger injection.     The past medical history was reviewed updated in the EMR. This includes medications, surgeries, social history, and review of systems.    Physical examination:  Well-developed, well-nourished and in no acute distress.  Alert and oriented to surroundings.  On examination of the left long (middle) finger there is a palpable tender nodule at the level of the A1 pulley.  Palpable clicking noted today.  Able to make a full composite fist.  Well-healed prior carpal tunnel incision.    Positive Tinel's at the right carpal tunnel, positive carpal tunnel compression test.  Sensation intact light touch to median, radial, ulnar nerve distributions.  No thenar atrophy.    Assessment: Aleshia Land is a 50 year old y/o female who presents with the following:   Bilateral carpal tunnel syndrome. Now S/p left open carpal tunnel release.   Left middle trigger finger.     Plan: Discussed treatment options for right carpal tunnel and left trigger finger patient like to proceed with steroid injections.  Patient can follow-up as needed.    Procedure:   After written informed consent was obtained, the patient's right wrist was prepped with chloraprep.  3 mL of a mixture of 1 cc 4mg/ml of  dexamethasone and 2 cc 1% lidocaine was injected into the right carpal tunnel.  There were no immediate complications.    Left trigger finger injection  After written informed consent was obtained, the area was prepped with chloraprep. 2mL of a 1:1 mixture of 4mg/ml of dexamethasone and  of 1% lidocaine was injected into the flexor sheath of the middle finger finger at the level of the A1 pulley. There were no immediate complications.     ELISA GAITAN PA-C  Orthopaedic Surgery        Again, thank you for allowing me to participate in the care of your patient.        Sincerely,        Elisa Gaitan PA-C    Electronically signed

## 2025-07-14 RX ORDER — DEXAMETHASONE SODIUM PHOSPHATE 4 MG/ML
4 INJECTION, SOLUTION INTRA-ARTICULAR; INTRALESIONAL; INTRAMUSCULAR; INTRAVENOUS; SOFT TISSUE
Status: COMPLETED | OUTPATIENT
Start: 2025-07-11 | End: 2025-07-11

## 2025-07-14 RX ORDER — LIDOCAINE HYDROCHLORIDE 10 MG/ML
2 INJECTION, SOLUTION EPIDURAL; INFILTRATION; INTRACAUDAL; PERINEURAL
Status: COMPLETED | OUTPATIENT
Start: 2025-07-11 | End: 2025-07-11

## 2025-07-14 RX ORDER — LIDOCAINE HYDROCHLORIDE 10 MG/ML
1 INJECTION, SOLUTION EPIDURAL; INFILTRATION; INTRACAUDAL; PERINEURAL
Status: COMPLETED | OUTPATIENT
Start: 2025-07-11 | End: 2025-07-11

## (undated) DEVICE — BNDG ELASTIC 2"X5YDS UNSTERILE 6611-20

## (undated) DEVICE — SOL NACL 0.9% IRRIG 500ML BOTTLE 2F7123

## (undated) DEVICE — PACK MINOR HAND CUSTOM ASC 37-0097A

## (undated) DEVICE — ESU HIGH TEMP LOOP TIP AA03

## (undated) DEVICE — TOURNIQUET SGL BLADDER 18"X4" RED 5921-218-135

## (undated) DEVICE — LINEN ORTHO PACK 5446

## (undated) DEVICE — DRSG STERI STRIP 1/2X4" R1547

## (undated) DEVICE — CAST PADDING 2" STERILE 9062S

## (undated) DEVICE — GLOVE BIOGEL PI MICRO SZ 7.5 48575

## (undated) DEVICE — SU ETHILON 4-0 PS-2 18" BLACK 1667H

## (undated) DEVICE — PREP CHLORAPREP 26ML TINTED HI-LITE ORANGE 930815

## (undated) DEVICE — DRSG XEROFORM 1X8"

## (undated) RX ORDER — PROPOFOL 10 MG/ML
INJECTION, EMULSION INTRAVENOUS
Status: DISPENSED
Start: 2025-06-18

## (undated) RX ORDER — DEXAMETHASONE SODIUM PHOSPHATE 4 MG/ML
INJECTION, SOLUTION INTRA-ARTICULAR; INTRALESIONAL; INTRAMUSCULAR; INTRAVENOUS; SOFT TISSUE
Status: DISPENSED
Start: 2025-07-11

## (undated) RX ORDER — LIDOCAINE HYDROCHLORIDE 10 MG/ML
INJECTION, SOLUTION EPIDURAL; INFILTRATION; INTRACAUDAL; PERINEURAL
Status: DISPENSED
Start: 2025-07-11

## (undated) RX ORDER — FENTANYL CITRATE 50 UG/ML
INJECTION, SOLUTION INTRAMUSCULAR; INTRAVENOUS
Status: DISPENSED
Start: 2025-06-18

## (undated) RX ORDER — LIDOCAINE HYDROCHLORIDE AND EPINEPHRINE 10; 10 MG/ML; UG/ML
INJECTION, SOLUTION INFILTRATION; PERINEURAL
Status: DISPENSED
Start: 2025-06-18

## (undated) RX ORDER — ACETAMINOPHEN 325 MG/1
TABLET ORAL
Status: DISPENSED
Start: 2025-06-18